# Patient Record
Sex: FEMALE | Race: WHITE | NOT HISPANIC OR LATINO | URBAN - METROPOLITAN AREA
[De-identification: names, ages, dates, MRNs, and addresses within clinical notes are randomized per-mention and may not be internally consistent; named-entity substitution may affect disease eponyms.]

---

## 2020-07-25 ENCOUNTER — TELEPHONE ENCOUNTER (OUTPATIENT)
Dept: URBAN - METROPOLITAN AREA CLINIC 13 | Facility: CLINIC | Age: 55
End: 2020-07-25

## 2020-07-25 RX ORDER — CETIRIZINE HYDROCHLORIDE 10 MG/1
TAKE 1 TABLET DAILY TABLET, FILM COATED ORAL
Refills: 0 | OUTPATIENT
End: 2013-02-28

## 2020-07-25 RX ORDER — FEXOFENADINE HYDROCHLORIDE 180 MG/1
TAKE 1 TABLET DAILY TABLET, FILM COATED ORAL
Refills: 0 | OUTPATIENT
End: 2012-06-22

## 2020-07-25 RX ORDER — DEXLANSOPRAZOLE 60 MG/1
TAKE ONE CAPSULE BY MOUTH EVERY MORNING BEFORE BREAKFAST CAPSULE, DELAYED RELEASE ORAL
Qty: 90 | Refills: 3 | OUTPATIENT
Start: 2012-04-24 | End: 2018-03-13

## 2020-07-25 RX ORDER — LINACLOTIDE 145 UG/1
TAKE 1 CAPSULE BY MOUTH EVERY MORNING ON AN EMPTY STOMACH CAPSULE, GELATIN COATED ORAL
Qty: 30 | Refills: 1 | OUTPATIENT
Start: 2013-07-22 | End: 2014-01-27

## 2020-07-25 RX ORDER — ESZOPICLONE 3 MG/1
TAKE 1 TABLET BEDTIME PRN TABLET, COATED ORAL
Qty: 30 | Refills: 5 | OUTPATIENT
End: 2017-04-04

## 2020-07-25 RX ORDER — SUCRALFATE 1 G/10ML
TAKE 1 TEASPOONFUL THREE TIMES A DAY SUSPENSION ORAL
Qty: 450 | Refills: 0 | OUTPATIENT
Start: 2012-04-25 | End: 2012-06-22

## 2020-07-25 RX ORDER — SUCRALFATE 1 G/1
DISSOLVE 1 TABLET IN 2-3 TABLESPOONS OF WATER AND DRINK BEFORE MEALS TABLET ORAL
Qty: 90 | Refills: 0 | OUTPATIENT
Start: 2014-02-10 | End: 2016-03-08

## 2020-07-25 RX ORDER — PANTOPRAZOLE SODIUM 40 MG/1
TAKE 1 TABLET DAILY TABLET, DELAYED RELEASE ORAL
Qty: 14 | Refills: 0 | OUTPATIENT
Start: 2014-05-02 | End: 2016-03-08

## 2020-07-25 RX ORDER — LANSOPRAZOLE 30 MG/1
TAKE 1 CAPSULE DAILY CAPSULE, DELAYED RELEASE ORAL
Qty: 30 | Refills: 0 | OUTPATIENT
Start: 2014-04-23 | End: 2016-03-08

## 2020-07-25 RX ORDER — LINACLOTIDE 290 UG/1
TAKE 1 CAPSULE DAILY 30 MINUTES BEFORE BREAKFAST CAPSULE, GELATIN COATED ORAL
Qty: 30 | Refills: 3 | OUTPATIENT
Start: 2013-11-15 | End: 2016-03-08

## 2020-07-25 RX ORDER — LINACLOTIDE 290 UG/1
TAKE 1 CAPSULE DAILY ON EMPTY STOMACH CAPSULE, GELATIN COATED ORAL
Qty: 30 | Refills: 1 | OUTPATIENT
Start: 2013-05-14 | End: 2013-07-22

## 2020-07-25 RX ORDER — MAGNESIUM HYDROXIDE 100 %
POWDER (GRAM) MISCELLANEOUS
Refills: 0 | OUTPATIENT
End: 2013-05-10

## 2020-07-26 ENCOUNTER — TELEPHONE ENCOUNTER (OUTPATIENT)
Dept: URBAN - METROPOLITAN AREA CLINIC 13 | Facility: CLINIC | Age: 55
End: 2020-07-26

## 2020-07-26 RX ORDER — SUCRALFATE 1 G/1
TABLET ORAL
Qty: 100 | Refills: 0 | Status: ACTIVE | COMMUNITY
Start: 2012-04-02

## 2020-07-26 RX ORDER — ALPRAZOLAM 0.5 MG/1
TABLET ORAL
Qty: 60 | Refills: 0 | Status: ACTIVE | COMMUNITY
Start: 2013-09-03

## 2020-07-26 RX ORDER — CIPROFLOXACIN HYDROCHLORIDE 500 MG/1
TK 1 T PO BID FOR 7 DAYS TABLET, FILM COATED ORAL
Qty: 14 | Refills: 0 | Status: ACTIVE | COMMUNITY
Start: 2019-10-28

## 2020-07-26 RX ORDER — METRONIDAZOLE 7.5 MG/G
GEL VAGINAL
Qty: 70 | Refills: 0 | Status: ACTIVE | COMMUNITY
Start: 2013-11-19

## 2020-07-26 RX ORDER — ESTRADIOL 0.1 MG/G
CREAM VAGINAL
Qty: 42 | Refills: 0 | Status: ACTIVE | COMMUNITY
Start: 2019-10-08

## 2020-07-26 RX ORDER — DESONIDE 0.5 MG/G
CREAM TOPICAL
Qty: 60 | Refills: 0 | Status: ACTIVE | COMMUNITY
Start: 2012-04-12

## 2020-07-26 RX ORDER — ESOMEPRAZOLE MAGNESIUM 40 MG
TK ONE C PO QD CAPSULE,DELAYED RELEASE (ENTERIC COATED) ORAL
Qty: 30 | Refills: 0 | Status: ACTIVE | COMMUNITY
Start: 2012-03-30

## 2020-07-26 RX ORDER — GABAPENTIN 100 MG/1
CAPSULE ORAL
Qty: 60 | Refills: 0 | Status: ACTIVE | COMMUNITY
Start: 2013-11-05

## 2020-07-26 RX ORDER — ALPRAZOLAM 0.5 MG/1
TK 1/2-1 T PO PRN FOR FLIGHT ANXIETY TABLET ORAL
Qty: 4 | Refills: 0 | Status: ACTIVE | COMMUNITY
Start: 2019-10-29

## 2020-07-26 RX ORDER — DEXLANSOPRAZOLE 60 MG/1
TAKE ONE CAPSULE BY MOUTH EVERY MORNING BEFORE BREAKFAST CAPSULE, DELAYED RELEASE ORAL
Qty: 90 | Refills: 3 | Status: ACTIVE | COMMUNITY
Start: 2019-11-07

## 2020-07-26 RX ORDER — PERMETHRIN CREAM 5% W/W 50 MG/G
APP QHS FROM NECK DOWN. LEAVE OVERNIGHT. WASH OFF NEXT AM. WASH BEDDIN CREAM TOPICAL
Qty: 60 | Refills: 0 | Status: ACTIVE | COMMUNITY
Start: 2012-04-12

## 2020-07-26 RX ORDER — OSELTAMIVIR PHOSPHATE 75 MG
CAPSULE ORAL
Qty: 10 | Refills: 0 | Status: ACTIVE | COMMUNITY
Start: 2012-11-14

## 2020-07-26 RX ORDER — PROCHLORPERAZINE MALEATE 10 MG/1
TABLET ORAL
Qty: 90 | Refills: 0 | Status: ACTIVE | COMMUNITY
Start: 2013-10-22

## 2020-07-26 RX ORDER — METHENAMINE, SODIUM PHOSPHATE, MONOBASIC, METHYLENE BLUE, AND HYOSCYAMINE SULFATE 81.6; 40.8; 10.8; .12 MG/1; MG/1; MG/1; MG/1
TABLET, COATED ORAL
Qty: 60 | Refills: 0 | Status: ACTIVE | COMMUNITY
Start: 2012-01-10

## 2020-07-26 RX ORDER — FLUCONAZOLE 150 MG/1
TABLET ORAL
Qty: 2 | Refills: 0 | Status: ACTIVE | COMMUNITY
Start: 2012-12-20

## 2020-07-26 RX ORDER — CICLESONIDE 50 UG/1
SPRAY NASAL
Qty: 12 | Refills: 0 | Status: ACTIVE | COMMUNITY
Start: 2011-08-03

## 2020-07-26 RX ORDER — CEFUROXIME AXETIL 250 MG/1
TABLET ORAL
Qty: 28 | Refills: 0 | Status: ACTIVE | COMMUNITY
Start: 2012-03-01

## 2020-07-26 RX ORDER — METHSCOPOLAMINE BROMIDE 2.5 MG/1
TABLET ORAL
Qty: 60 | Refills: 0 | Status: ACTIVE | COMMUNITY
Start: 2011-09-14

## 2020-07-26 RX ORDER — ACYCLOVIR 50 MG/G
OINTMENT TOPICAL
Qty: 15 | Refills: 0 | Status: ACTIVE | COMMUNITY
Start: 2013-12-13

## 2020-07-26 RX ORDER — FLUCONAZOLE 150 MG/1
TABLET ORAL
Qty: 1 | Refills: 0 | Status: ACTIVE | COMMUNITY
Start: 2012-11-24

## 2020-07-26 RX ORDER — FLUTICASONE PROPIONATE 50 UG/1
SPRAY, METERED NASAL
Qty: 16 | Refills: 0 | Status: ACTIVE | COMMUNITY
Start: 2012-03-01

## 2020-07-26 RX ORDER — LEVOCETIRIZINE DIHYDROCHLORIDE 5 MG/1
TABLET ORAL
Qty: 30 | Refills: 0 | Status: ACTIVE | COMMUNITY
Start: 2012-08-20

## 2020-07-26 RX ORDER — NITROFURANTOIN MONOHYDRATE/MACROCRYSTALLINE 25; 75 MG/1; MG/1
TK ONE C PO BID FOR 7 DAYS CAPSULE ORAL
Qty: 14 | Refills: 0 | Status: ACTIVE | COMMUNITY
Start: 2011-06-02

## 2020-07-26 RX ORDER — DEXAMETHASONE 4 MG/1
TABLET ORAL
Qty: 30 | Refills: 0 | Status: ACTIVE | COMMUNITY
Start: 2013-11-18

## 2020-07-26 RX ORDER — TAMOXIFEN CITRATE 10 MG/1
TAKE 1 TABLET EVERY 12 HOURS DAILY TABLET, FILM COATED ORAL
Refills: 0 | Status: ACTIVE | COMMUNITY
Start: 2014-04-04

## 2020-07-26 RX ORDER — CHLORHEXIDINE GLUCONATE 0.12% ORAL RINSE 1.2 MG/ML
SWISH 15ML PO FOR 30 SECONDS BID THEN EXPECTORATE SOLUTION ORAL
Qty: 255 | Refills: 0 | Status: ACTIVE | COMMUNITY
Start: 2011-03-25

## 2020-07-26 RX ORDER — FLUOCINOLONE ACETONIDE 0.11 MG/118.28ML
OIL TOPICAL
Qty: 118 | Refills: 0 | Status: ACTIVE | COMMUNITY
Start: 2013-08-30

## 2020-07-26 RX ORDER — METHENAMINE HIPPURATE 1 G/1
TABLET ORAL
Qty: 60 | Refills: 0 | Status: ACTIVE | COMMUNITY
Start: 2019-10-07

## 2020-07-26 RX ORDER — FLUOCINONIDE 0.5 MG/G
APP A THIN AMOUNT AA TID TO QID FOR 7 DAYS OR UNTIL HEALED GEL TOPICAL
Qty: 15 | Refills: 0 | Status: ACTIVE | COMMUNITY
Start: 2011-06-27

## 2020-07-26 RX ORDER — KETOCONAZOLE 20.5 MG/ML
U AS SHAMPOO AND LEAVE ON FOR 5-10 MINUTES BEFORE RINSING SHAMPOO, SUSPENSION TOPICAL
Qty: 120 | Refills: 0 | Status: ACTIVE | COMMUNITY
Start: 2011-03-08

## 2020-07-26 RX ORDER — NYSTATIN 100000 [USP'U]/ML
SUSPENSION ORAL
Qty: 255 | Refills: 0 | Status: ACTIVE | COMMUNITY
Start: 2014-01-07

## 2020-07-26 RX ORDER — VENLAFAXINE HYDROCHLORIDE 37.5 MG/1
CAPSULE, EXTENDED RELEASE ORAL
Qty: 30 | Refills: 0 | Status: ACTIVE | COMMUNITY
Start: 2014-01-03

## 2020-07-26 RX ORDER — KETOROLAC TROMETHAMINE 10 MG/1
TABLET, FILM COATED ORAL
Qty: 15 | Refills: 0 | Status: ACTIVE | COMMUNITY
Start: 2013-11-18

## 2020-07-26 RX ORDER — OXYCODONE AND ACETAMINOPHEN 5; 325 MG/1; MG/1
TK 1 T PO Q 6 H PRN P TABLET ORAL
Qty: 10 | Refills: 0 | Status: ACTIVE | COMMUNITY
Start: 2019-06-12

## 2020-07-26 RX ORDER — LORAZEPAM 1 MG/1
TABLET ORAL
Qty: 30 | Refills: 0 | Status: ACTIVE | COMMUNITY
Start: 2013-11-05

## 2020-07-26 RX ORDER — AMOXICILLIN 875 MG/1
TK 1 T PO  BID TABLET, FILM COATED ORAL
Qty: 14 | Refills: 0 | Status: ACTIVE | COMMUNITY
Start: 2012-05-25

## 2020-07-26 RX ORDER — AMOXICILLIN 875 MG/1
TABLET, FILM COATED ORAL
Qty: 14 | Refills: 0 | Status: ACTIVE | COMMUNITY
Start: 2012-08-15

## 2020-07-26 RX ORDER — CEPHALEXIN 500 MG/1
CAPSULE ORAL
Qty: 42 | Refills: 0 | Status: ACTIVE | COMMUNITY
Start: 2012-10-10

## 2020-07-26 RX ORDER — MUPIROCIN 20 MG/G
OINTMENT TOPICAL
Qty: 22 | Refills: 0 | Status: ACTIVE | COMMUNITY
Start: 2011-09-19

## 2020-07-26 RX ORDER — DEXLANSOPRAZOLE 30 MG/1
TAKE 1 CAPSULE BY MOUTH EVERY MORNING BEFORE BREAKFAST CAPSULE, DELAYED RELEASE ORAL
Qty: 90 | Refills: 3 | Status: ACTIVE | COMMUNITY
Start: 2018-03-13

## 2020-07-26 RX ORDER — HYDROCODONE BITARTRATE AND ACETAMINOPHEN 5; 325 MG/1; MG/1
TK 1 T PO Q 4-6 H  PRN P TABLET ORAL
Qty: 30 | Refills: 0 | Status: ACTIVE | COMMUNITY
Start: 2013-04-05

## 2020-07-26 RX ORDER — FLUOROURACIL 50 MG/G
APPLY TO THE FOREHEAD BID FOR 2 WEEKS CREAM TOPICAL
Qty: 40 | Refills: 0 | Status: ACTIVE | COMMUNITY
Start: 2019-02-04

## 2020-07-26 RX ORDER — LIDOCAINE AND PRILOCAINE 25; 25 MG/G; MG/G
CREAM TOPICAL
Qty: 30 | Refills: 0 | Status: ACTIVE | COMMUNITY
Start: 2011-10-10

## 2020-07-26 RX ORDER — NITROFURANTOIN MONOHYDRATE/MACROCRYSTALLINE 25; 75 MG/1; MG/1
CAPSULE ORAL
Qty: 10 | Refills: 0 | Status: ACTIVE | COMMUNITY
Start: 2012-01-08

## 2020-07-26 RX ORDER — TRIAMCINOLONE ACETONIDE 1 MG/G
APP AA BID.  DO NOT APPLY TO FACE CREAM TOPICAL
Qty: 255 | Refills: 0 | Status: ACTIVE | COMMUNITY
Start: 2012-04-12

## 2020-07-26 RX ORDER — AZITHROMYCIN DIHYDRATE 250 MG/1
USE UTD TABLET, FILM COATED ORAL
Qty: 6 | Refills: 0 | Status: ACTIVE | COMMUNITY
Start: 2013-04-05

## 2020-07-26 RX ORDER — OLOPATADINE HYDROCHLORIDE 600 UG/1
SPRAY, METERED NASAL
Qty: 30 | Refills: 0 | Status: ACTIVE | COMMUNITY
Start: 2011-08-03

## 2020-07-26 RX ORDER — PENTOSAN POLYSULFATE SODIUM 100 MG/1
CAPSULE, GELATIN COATED ORAL
Qty: 100 | Refills: 0 | Status: ACTIVE | COMMUNITY
Start: 2012-01-10

## 2020-07-26 RX ORDER — DIAZEPAM 10 MG/1
BRING TO PROCEDURE TABLET ORAL
Qty: 1 | Refills: 0 | Status: ACTIVE | COMMUNITY
Start: 2011-05-12

## 2020-07-26 RX ORDER — SULFAMETHOXAZOLE AND TRIMETHOPRIM 800; 160 MG/1; MG/1
TK 1 T PO BID FOR 7 DAYS TABLET ORAL
Qty: 14 | Refills: 0 | Status: ACTIVE | COMMUNITY
Start: 2019-10-20

## 2020-07-26 RX ORDER — FLUCONAZOLE 100 MG/1
TABLET ORAL
Qty: 2 | Refills: 0 | Status: ACTIVE | COMMUNITY
Start: 2011-09-12

## 2020-07-26 RX ORDER — TRETINOIN 0.5 MG/G
CREAM TOPICAL
Qty: 30 | Refills: 0 | Status: ACTIVE | COMMUNITY
Start: 2011-12-08

## 2020-07-26 RX ORDER — AMOXICILLIN AND CLAVULANATE POTASSIUM 875; 125 MG/1; MG/1
TABLET, FILM COATED ORAL
Qty: 14 | Refills: 0 | Status: ACTIVE | COMMUNITY
Start: 2012-01-21

## 2020-07-26 RX ORDER — PROMETHAZINE HYDROCHLORIDE 12.5 MG/1
SUPPOSITORY RECTAL
Qty: 20 | Refills: 0 | Status: ACTIVE | COMMUNITY
Start: 2013-12-11

## 2020-07-26 RX ORDER — BENZONATATE 100 MG/1
CAPSULE ORAL
Qty: 30 | Refills: 0 | Status: ACTIVE | COMMUNITY
Start: 2011-09-19

## 2020-07-26 RX ORDER — NYSTATIN AND TRIAMCINOLONE ACETONIDE 100000; 1 MG/G; MG/G
CREAM TOPICAL
Qty: 30 | Refills: 0 | Status: ACTIVE | COMMUNITY
Start: 2013-11-06

## 2020-07-26 RX ORDER — OXYCODONE AND ACETAMINOPHEN 5; 325 MG/1; MG/1
TABLET ORAL
Qty: 30 | Refills: 0 | Status: ACTIVE | COMMUNITY
Start: 2013-11-08

## 2020-07-26 RX ORDER — NYSTATIN AND TRIAMCINOLONE ACETONIDE 100000; 1 MG/G; MG/G
APPLY ON THE SKIN BID CREAM TOPICAL
Qty: 30 | Refills: 0 | Status: ACTIVE | COMMUNITY
Start: 2019-12-27

## 2020-09-17 ENCOUNTER — OFFICE VISIT (OUTPATIENT)
Dept: URBAN - METROPOLITAN AREA CLINIC 113 | Facility: CLINIC | Age: 55
End: 2020-09-17

## 2020-11-03 ENCOUNTER — OFFICE VISIT (OUTPATIENT)
Dept: URBAN - METROPOLITAN AREA CLINIC 113 | Facility: CLINIC | Age: 55
End: 2020-11-03

## 2020-11-03 NOTE — HPI-TODAY'S VISIT:
Ms. Traore is a 55-year-old woman with a history of GERD and irritable bowel syndrome, presenting for follow-up. She was last seen in August 2019 and GERD symptoms were controlled on Dexilant 30 mg daily.  Red quadrant ultrasound was ordered due to complaint of intermittent right upper quadrant pain.  Constipation predominant irritable bowel syndrome symptoms were controlled with dietary modifications and magnesium. Right upper quadrant ultrasound was normal on 9/9/2019.

## 2020-11-17 ENCOUNTER — OFFICE VISIT (OUTPATIENT)
Dept: URBAN - METROPOLITAN AREA CLINIC 113 | Facility: CLINIC | Age: 55
End: 2020-11-17

## 2020-12-01 ENCOUNTER — OFFICE VISIT (OUTPATIENT)
Dept: URBAN - METROPOLITAN AREA CLINIC 113 | Facility: CLINIC | Age: 55
End: 2020-12-01

## 2021-01-05 ENCOUNTER — OFFICE VISIT (OUTPATIENT)
Dept: URBAN - METROPOLITAN AREA CLINIC 113 | Facility: CLINIC | Age: 56
End: 2021-01-05
Payer: COMMERCIAL

## 2021-01-05 ENCOUNTER — WEB ENCOUNTER (OUTPATIENT)
Dept: URBAN - METROPOLITAN AREA CLINIC 113 | Facility: CLINIC | Age: 56
End: 2021-01-05

## 2021-01-05 VITALS
DIASTOLIC BLOOD PRESSURE: 81 MMHG | HEART RATE: 67 BPM | WEIGHT: 105 LBS | HEIGHT: 62 IN | TEMPERATURE: 98 F | BODY MASS INDEX: 19.32 KG/M2 | SYSTOLIC BLOOD PRESSURE: 123 MMHG

## 2021-01-05 DIAGNOSIS — K21.9 GERD: ICD-10-CM

## 2021-01-05 PROCEDURE — 99213 OFFICE O/P EST LOW 20 MIN: CPT | Performed by: PHYSICIAN ASSISTANT

## 2021-01-05 RX ORDER — DEXLANSOPRAZOLE 60 MG/1
TAKE ONE CAPSULE BY MOUTH EVERY MORNING BEFORE BREAKFAST CAPSULE, DELAYED RELEASE ORAL ONCE A DAY
Qty: 30 CAPSULES | Refills: 11
Start: 2019-11-07

## 2021-01-05 RX ORDER — DEXLANSOPRAZOLE 60 MG/1
TAKE ONE CAPSULE BY MOUTH EVERY MORNING BEFORE BREAKFAST CAPSULE, DELAYED RELEASE ORAL
Qty: 90 | Refills: 3 | Status: ACTIVE | COMMUNITY
Start: 2019-11-07

## 2021-01-05 RX ORDER — TAMOXIFEN CITRATE 10 MG/1
TAKE 1 TABLET EVERY 12 HOURS DAILY TABLET, FILM COATED ORAL
Refills: 0 | Status: ACTIVE | COMMUNITY
Start: 2014-04-04

## 2021-01-05 NOTE — HPI-TODAY'S VISIT:
Ms. Traore is a 55 year old woman with a history of GERD and irritable bowel syndrome, presenting for routine follow up. She was last seen in August, 2019 and GERD symptoms were controlled on Dexilant 30mg daily.  RUQ ultrasound was ordered to further evaluate intermittent RUQ pain. She began having breakthrough symptoms on Dexilant 30mg to resumed 60mg dosing with improvement of symptoms.  She is doing well on Dexilant 60mg daily.  She denies any breakthrough symptoms including dysphagia, heartburn or reflux.  She denies any other GI symptoms including nausea, vomiting, abdominal pain, diarrhea or constipation.  Her bowel habits are normal.

## 2021-01-05 NOTE — HPI-OTHER HISTORIES
RUQ ultrasound 9/9/19: normal. Labs 6/3/19: WBC 3.1, Hgb 11.7, MCV 82, Plt 195; TB 0.3, ALP 47, AST 18, ALT 10; BMP normal,  12.3.  EGD (10/25/13, dysphagia): esophagus was normal. She underwent empiric dilation with 15 mm Savary dilator.  Colonoscopy (5/10/13): normal.

## 2021-02-12 ENCOUNTER — TELEPHONE ENCOUNTER (OUTPATIENT)
Dept: URBAN - METROPOLITAN AREA CLINIC 113 | Facility: CLINIC | Age: 56
End: 2021-02-12

## 2021-02-12 RX ORDER — DEXLANSOPRAZOLE 60 MG/1
TAKE ONE CAPSULE BY MOUTH EVERY MORNING BEFORE BREAKFAST CAPSULE, DELAYED RELEASE ORAL ONCE A DAY
Qty: 90 | Refills: 1
Start: 2019-11-07

## 2021-07-01 ENCOUNTER — OFFICE VISIT (OUTPATIENT)
Dept: URBAN - METROPOLITAN AREA CLINIC 92 | Facility: CLINIC | Age: 56
End: 2021-07-01
Payer: COMMERCIAL

## 2021-07-01 VITALS
HEART RATE: 72 BPM | BODY MASS INDEX: 18.77 KG/M2 | HEIGHT: 62 IN | TEMPERATURE: 97 F | WEIGHT: 102 LBS | DIASTOLIC BLOOD PRESSURE: 71 MMHG | SYSTOLIC BLOOD PRESSURE: 145 MMHG

## 2021-07-01 DIAGNOSIS — R63.4 WEIGHT LOSS: ICD-10-CM

## 2021-07-01 DIAGNOSIS — K58.1 IRRITABLE BOWEL SYNDROME WITH CONSTIPATION: ICD-10-CM

## 2021-07-01 DIAGNOSIS — K21.9 GERD: ICD-10-CM

## 2021-07-01 DIAGNOSIS — R19.4 CHANGE IN BOWEL HABIT: ICD-10-CM

## 2021-07-01 DIAGNOSIS — K59.01 CONSTIPATION: ICD-10-CM

## 2021-07-01 DIAGNOSIS — C50.919 BREAST CANCER: ICD-10-CM

## 2021-07-01 DIAGNOSIS — K62.89 PROCTALGIA: ICD-10-CM

## 2021-07-01 DIAGNOSIS — K58.9 IBS (IRRITABLE BOWEL SYNDROME): ICD-10-CM

## 2021-07-01 PROCEDURE — 99244 OFF/OP CNSLTJ NEW/EST MOD 40: CPT | Performed by: INTERNAL MEDICINE

## 2021-07-01 RX ORDER — POLYETHYLENE GLYCOL 3350 17 G/17G
1 PACKET MIXED WITH 8 OUNCES OF FLUID POWDER, FOR SOLUTION ORAL ONCE A DAY
Qty: 30 | Refills: 11 | OUTPATIENT
Start: 2021-07-01 | End: 2022-06-26

## 2021-07-01 RX ORDER — HYDROCORTISONE ACETATE AND PRAMOXINE HYDROCHLORIDE 25; 10 MG/G; MG/G
1 APPLICATION CREAM TOPICAL THREE TIMES A DAY
Qty: 42 | Refills: 3 | OUTPATIENT
Start: 2021-07-01 | End: 2021-08-26

## 2021-07-01 RX ORDER — DEXLANSOPRAZOLE 60 MG/1
TAKE ONE CAPSULE BY MOUTH EVERY MORNING BEFORE BREAKFAST CAPSULE, DELAYED RELEASE ORAL ONCE A DAY
Qty: 90 | Refills: 1

## 2021-07-01 RX ORDER — TAMOXIFEN CITRATE 10 MG/1
TAKE 1 TABLET EVERY 12 HOURS DAILY TABLET, FILM COATED ORAL
Refills: 0 | Status: ACTIVE | COMMUNITY
Start: 2014-04-04

## 2021-07-01 RX ORDER — DEXLANSOPRAZOLE 60 MG/1
TAKE ONE CAPSULE BY MOUTH EVERY MORNING BEFORE BREAKFAST CAPSULE, DELAYED RELEASE ORAL ONCE A DAY
Qty: 90 | Refills: 1 | Status: ACTIVE | COMMUNITY
Start: 2019-11-07

## 2021-07-01 NOTE — HPI-TODAY'S VISIT:
wt decr 3# over 6mo (was 105)  Ms. Traore is a 55 year old woman with a history of GERD and irritable bowel syndrome, referred by Dr. Kimberly Bledsoe for a new onset of constipation; a copy of today's report will be sent to the referring MD  1 AMNA LEGER, small volume stool, assoc w proctalgia c/w prior fissure  no improvement w benefiber  She was last seen in August, 2019 and GERD symptoms were controlled on Dexilant 30mg daily.  RUQ ultrasound was ordered to further evaluate intermittent RUQ pain.  She began having breakthrough symptoms on Dexilant 30mg to resumed 60mg dosing with improvement of symptoms.  She is doing well on Dexilant 60mg daily.  She denies any breakthrough symptoms including dysphagia, heartburn or reflux.  She denies any other GI symptoms including nausea, vomiting, abdominal pain, diarrhea  RUQ ultrasound 9/9/19: normal. Labs 6/3/19: WBC 3.1, Hgb 11.7, MCV 82, Plt 195; TB 0.3, ALP 47, AST 18, ALT 10; BMP normal,  12.3.  EGD (10/25/13, dysphagia): esophagus was normal. She underwent empiric dilation with 15 mm Savary dilator.  Colonoscopy (5/10/13): normal.  [facility]

## 2021-07-15 ENCOUNTER — TELEPHONE ENCOUNTER (OUTPATIENT)
Dept: URBAN - METROPOLITAN AREA CLINIC 113 | Facility: CLINIC | Age: 56
End: 2021-07-15

## 2021-07-15 RX ORDER — DEXLANSOPRAZOLE 60 MG/1
TAKE ONE CAPSULE BY MOUTH EVERY MORNING BEFORE BREAKFAST CAPSULE, DELAYED RELEASE ORAL ONCE A DAY
Qty: 90 | Refills: 3

## 2021-08-02 ENCOUNTER — TELEPHONE ENCOUNTER (OUTPATIENT)
Dept: URBAN - METROPOLITAN AREA SURGERY CENTER 30 | Facility: SURGERY CENTER | Age: 56
End: 2021-08-02

## 2021-08-02 RX ORDER — DEXLANSOPRAZOLE 60 MG/1
TAKE ONE CAPSULE BY MOUTH EVERY MORNING BEFORE BREAKFAST CAPSULE, DELAYED RELEASE ORAL ONCE A DAY
Qty: 30 | Refills: 0

## 2021-09-01 PROBLEM — 10743008 IRRITABLE BOWEL SYNDROME: Status: ACTIVE | Noted: 2021-07-01

## 2021-09-01 PROBLEM — 254837009 BREAST CANCER: Status: ACTIVE | Noted: 2021-07-01

## 2021-09-13 ENCOUNTER — OFFICE VISIT (OUTPATIENT)
Dept: URBAN - METROPOLITAN AREA SURGERY CENTER 16 | Facility: SURGERY CENTER | Age: 56
End: 2021-09-13
Payer: COMMERCIAL

## 2021-09-13 DIAGNOSIS — R19.4 ALTERATION IN BOWEL ELIMINATION: ICD-10-CM

## 2021-09-13 PROCEDURE — 45378 DIAGNOSTIC COLONOSCOPY: CPT | Performed by: INTERNAL MEDICINE

## 2021-09-13 PROCEDURE — G8907 PT DOC NO EVENTS ON DISCHARG: HCPCS | Performed by: INTERNAL MEDICINE

## 2021-09-13 RX ORDER — POLYETHYLENE GLYCOL 3350 17 G/17G
1 PACKET MIXED WITH 8 OUNCES OF FLUID POWDER, FOR SOLUTION ORAL ONCE A DAY
Qty: 30 | Refills: 11 | Status: ACTIVE | COMMUNITY
Start: 2021-07-01 | End: 2022-06-26

## 2021-09-13 RX ORDER — TAMOXIFEN CITRATE 10 MG/1
TAKE 1 TABLET EVERY 12 HOURS DAILY TABLET, FILM COATED ORAL
Refills: 0 | Status: ACTIVE | COMMUNITY
Start: 2014-04-04

## 2021-09-13 RX ORDER — DEXLANSOPRAZOLE 60 MG/1
TAKE ONE CAPSULE BY MOUTH EVERY MORNING BEFORE BREAKFAST CAPSULE, DELAYED RELEASE ORAL ONCE A DAY
Qty: 30 | Refills: 0 | Status: ACTIVE | COMMUNITY

## 2021-09-24 ENCOUNTER — OFFICE VISIT (OUTPATIENT)
Dept: URBAN - METROPOLITAN AREA TELEHEALTH 2 | Facility: TELEHEALTH | Age: 56
End: 2021-09-24
Payer: COMMERCIAL

## 2021-09-24 DIAGNOSIS — K59.01 CONSTIPATION: ICD-10-CM

## 2021-09-24 DIAGNOSIS — K62.89 PROCTALGIA: ICD-10-CM

## 2021-09-24 DIAGNOSIS — K21.9 GERD: ICD-10-CM

## 2021-09-24 PROCEDURE — 99213 OFFICE O/P EST LOW 20 MIN: CPT | Performed by: INTERNAL MEDICINE

## 2021-09-24 RX ORDER — DEXLANSOPRAZOLE 60 MG/1
TAKE ONE CAPSULE BY MOUTH EVERY MORNING BEFORE BREAKFAST CAPSULE, DELAYED RELEASE ORAL ONCE A DAY
Qty: 30 | Refills: 0 | COMMUNITY

## 2021-09-24 RX ORDER — POLYETHYLENE GLYCOL 3350 17 G/17G
1 PACKET MIXED WITH 8 OUNCES OF FLUID POWDER, FOR SOLUTION ORAL ONCE A DAY
Qty: 30 | Refills: 11 | COMMUNITY
Start: 2021-07-01 | End: 2022-06-26

## 2021-09-24 RX ORDER — TAMOXIFEN CITRATE 10 MG/1
TAKE 1 TABLET EVERY 12 HOURS DAILY TABLET, FILM COATED ORAL
Refills: 0 | COMMUNITY
Start: 2014-04-04

## 2021-09-24 RX ORDER — DEXLANSOPRAZOLE 60 MG/1
TAKE ONE CAPSULE BY MOUTH EVERY MORNING BEFORE BREAKFAST CAPSULE, DELAYED RELEASE ORAL ONCE A DAY
Qty: 90 | Refills: 3

## 2021-09-24 NOTE — HPI-TODAY'S VISIT:
wt incr 4# over 3mo (was 102)  9/13/21 Colonoscopy WNL  1 BM QAM, small volume stool, improved on Miralax  no further proctalgia  requesting dexilant refills  RUQ ultrasound 9/9/19: normal. Labs 6/3/19: WBC 3.1, Hgb 11.7, MCV 82, Plt 195; TB 0.3, ALP 47, AST 18, ALT 10; BMP normal,  12.3.  EGD (10/25/13, dysphagia): esophagus was normal. She underwent empiric dilation with 15 mm Savary dilator.   3 children daughter Cassidy [facility] [place]

## 2021-10-13 ENCOUNTER — WEB ENCOUNTER (OUTPATIENT)
Dept: URBAN - METROPOLITAN AREA CLINIC 92 | Facility: CLINIC | Age: 56
End: 2021-10-13

## 2021-10-13 RX ORDER — DEXLANSOPRAZOLE 60 MG/1
TAKE ONE CAPSULE BY MOUTH EVERY MORNING BEFORE BREAKFAST CAPSULE, DELAYED RELEASE ORAL ONCE A DAY
Qty: 90 | Refills: 3 | Status: ACTIVE | COMMUNITY

## 2021-10-13 RX ORDER — POLYETHYLENE GLYCOL 3350 17 G/17G
1 PACKET MIXED WITH 8 OUNCES OF FLUID POWDER, FOR SOLUTION ORAL ONCE A DAY
Qty: 30 | Refills: 11 | COMMUNITY
Start: 2021-07-01 | End: 2022-06-26

## 2021-10-13 RX ORDER — TAMOXIFEN CITRATE 10 MG/1
TAKE 1 TABLET EVERY 12 HOURS DAILY TABLET, FILM COATED ORAL
Refills: 0 | COMMUNITY
Start: 2014-04-04

## 2021-10-14 ENCOUNTER — WEB ENCOUNTER (OUTPATIENT)
Dept: URBAN - METROPOLITAN AREA CLINIC 92 | Facility: CLINIC | Age: 56
End: 2021-10-14

## 2021-11-05 ENCOUNTER — WEB ENCOUNTER (OUTPATIENT)
Dept: URBAN - METROPOLITAN AREA CLINIC 92 | Facility: CLINIC | Age: 56
End: 2021-11-05

## 2021-11-29 ENCOUNTER — OFFICE VISIT (OUTPATIENT)
Dept: URBAN - METROPOLITAN AREA SURGERY CENTER 16 | Facility: SURGERY CENTER | Age: 56
End: 2021-11-29
Payer: COMMERCIAL

## 2021-11-29 DIAGNOSIS — K29.60 ADENOPAPILLOMATOSIS GASTRICA: ICD-10-CM

## 2021-11-29 DIAGNOSIS — K22.89 ESOPHAGEAL BLEEDING: ICD-10-CM

## 2021-11-29 PROCEDURE — G8907 PT DOC NO EVENTS ON DISCHARG: HCPCS | Performed by: INTERNAL MEDICINE

## 2021-11-29 PROCEDURE — 43239 EGD BIOPSY SINGLE/MULTIPLE: CPT | Performed by: INTERNAL MEDICINE

## 2021-11-29 RX ORDER — FAMOTIDINE 40 MG/1
1 TABLET AT BEDTIME TABLET, FILM COATED ORAL ONCE A DAY
Qty: 30 TABLET | Refills: 11 | OUTPATIENT
Start: 2021-11-29

## 2021-11-29 RX ORDER — TAMOXIFEN CITRATE 10 MG/1
TAKE 1 TABLET EVERY 12 HOURS DAILY TABLET, FILM COATED ORAL
Refills: 0 | COMMUNITY
Start: 2014-04-04

## 2021-11-29 RX ORDER — POLYETHYLENE GLYCOL 3350 17 G/17G
1 PACKET MIXED WITH 8 OUNCES OF FLUID POWDER, FOR SOLUTION ORAL ONCE A DAY
Qty: 30 | Refills: 11 | COMMUNITY
Start: 2021-07-01 | End: 2022-06-26

## 2021-11-29 RX ORDER — OMEPRAZOLE 40 MG/1
1 CAPSULE 30 MINUTES BEFORE MORNING MEAL CAPSULE, DELAYED RELEASE ORAL TWICE DAILY
Qty: 60 | Refills: 11 | OUTPATIENT
Start: 2021-11-29

## 2021-11-29 RX ORDER — DEXLANSOPRAZOLE 60 MG/1
TAKE ONE CAPSULE BY MOUTH EVERY MORNING BEFORE BREAKFAST CAPSULE, DELAYED RELEASE ORAL ONCE A DAY
Qty: 90 | Refills: 3 | Status: ACTIVE | COMMUNITY

## 2021-12-11 ENCOUNTER — OFFICE VISIT (OUTPATIENT)
Dept: URBAN - METROPOLITAN AREA CLINIC 92 | Facility: CLINIC | Age: 56
End: 2021-12-11

## 2021-12-17 ENCOUNTER — OFFICE VISIT (OUTPATIENT)
Dept: URBAN - METROPOLITAN AREA TELEHEALTH 2 | Facility: TELEHEALTH | Age: 56
End: 2021-12-17
Payer: COMMERCIAL

## 2021-12-17 DIAGNOSIS — K62.89 PROCTALGIA: ICD-10-CM

## 2021-12-17 DIAGNOSIS — K59.01 CONSTIPATION: ICD-10-CM

## 2021-12-17 DIAGNOSIS — K31.7 GASTRIC POLYP: ICD-10-CM

## 2021-12-17 DIAGNOSIS — K21.9 GERD: ICD-10-CM

## 2021-12-17 PROCEDURE — 99214 OFFICE O/P EST MOD 30 MIN: CPT | Performed by: INTERNAL MEDICINE

## 2021-12-17 RX ORDER — OMEPRAZOLE 40 MG/1
1 CAPSULE 30 MINUTES BEFORE MORNING MEAL CAPSULE, DELAYED RELEASE ORAL TWICE DAILY
Qty: 60 | Refills: 11 | COMMUNITY
Start: 2021-11-29

## 2021-12-17 RX ORDER — POLYETHYLENE GLYCOL 3350 17 G/17G
1 PACKET MIXED WITH 8 OUNCES OF FLUID POWDER, FOR SOLUTION ORAL ONCE A DAY
Qty: 30 | Refills: 11 | COMMUNITY
Start: 2021-07-01 | End: 2022-06-26

## 2021-12-17 RX ORDER — TAMOXIFEN CITRATE 10 MG/1
TAKE 1 TABLET EVERY 12 HOURS DAILY TABLET, FILM COATED ORAL
Refills: 0 | COMMUNITY
Start: 2014-04-04

## 2021-12-17 RX ORDER — OMEPRAZOLE 40 MG/1
1 CAPSULE 30 MINUTES BEFORE MORNING MEAL CAPSULE, DELAYED RELEASE ORAL TWICE DAILY
Refills: 0
Start: 2021-11-29

## 2021-12-17 RX ORDER — FAMOTIDINE 40 MG/1
1 TABLET AT BEDTIME TABLET, FILM COATED ORAL ONCE A DAY
Qty: 30 TABLET | Refills: 11 | COMMUNITY
Start: 2021-11-29

## 2021-12-17 RX ORDER — FAMOTIDINE 40 MG/1
1 TABLET AT BEDTIME TABLET, FILM COATED ORAL ONCE A DAY
Qty: 90 | Refills: 0
Start: 2021-11-29

## 2021-12-17 NOTE — PHYSICAL EXAM CONSTITUTIONAL:
in no acute distress, well developed, well nourished, ambulating without difficulty, normal communication ability
Statement Selected

## 2021-12-17 NOTE — HPI-TODAY'S VISIT:
wt decr 1# over 2.5mo (was 106)  11/29/21 EGD EGJ polyp, pathology benign Reflux esophagitis Erythematous gastropathy, bxs negative for hypylori Normal duodenum  9/13/21 Colonoscopy WNL  1 BM QAM, small volume stool, improved on Miralax  no further proctalgia  on PPI BID and H2B, much less dyspepsia - "feeling good"  RUQ ultrasound 9/9/19: normal.  3 children daughter ZAHRA, Cassidy [facility]

## 2021-12-21 ENCOUNTER — ERX REFILL RESPONSE (OUTPATIENT)
Dept: URBAN - METROPOLITAN AREA CLINIC 92 | Facility: CLINIC | Age: 56
End: 2021-12-21

## 2021-12-21 RX ORDER — OMEPRAZOLE 40 MG/1
1 CAPSULE 30 MINUTES BEFORE MORNING MEAL CAPSULE, DELAYED RELEASE ORAL TWICE DAILY
Qty: 60 | Refills: 11 | OUTPATIENT

## 2021-12-21 RX ORDER — FAMOTIDINE 40 MG/1
1 TABLET AT BEDTIME TABLET, FILM COATED ORAL ONCE A DAY
Qty: 30 TABLET | Refills: 11 | OUTPATIENT

## 2021-12-21 RX ORDER — OMEPRAZOLE 40 MG/1
1 CAPSULE 30 MINUTES BEFORE MORNING MEAL BY MOUTH TWICE DAILY CAPSULE, DELAYED RELEASE ORAL
Qty: 60 CAPSULE | Refills: 12 | OUTPATIENT

## 2021-12-21 RX ORDER — FAMOTIDINE 40 MG/1
TAKE 1 TABLET BY MOUTH EVERY DAY AT BEDTIME FOR 30 DAYS TABLET, FILM COATED ORAL
Qty: 30 TABLET | Refills: 12 | OUTPATIENT

## 2022-01-08 ENCOUNTER — WEB ENCOUNTER (OUTPATIENT)
Dept: URBAN - METROPOLITAN AREA CLINIC 92 | Facility: CLINIC | Age: 57
End: 2022-01-08

## 2022-01-08 RX ORDER — HYDROCORTISONE ACETATE AND PRAMOXINE HYDROCHLORIDE 25; 10 MG/G; MG/G
1 APPLICATION CREAM TOPICAL THREE TIMES A DAY
Qty: 42 | Refills: 3 | OUTPATIENT
Start: 2022-01-08 | End: 2022-03-05

## 2022-01-08 RX ORDER — TAMOXIFEN CITRATE 10 MG/1
TAKE 1 TABLET EVERY 12 HOURS DAILY TABLET, FILM COATED ORAL
Refills: 0 | COMMUNITY
Start: 2014-04-04

## 2022-01-08 RX ORDER — SACCHAROMYCES BOULARDII 50 MG
2 CAPSULE CAPSULE ORAL TWICE A DAY
Qty: 120 CAPSULE | Refills: 11 | OUTPATIENT
Start: 2022-01-08 | End: 2023-01-03

## 2022-01-08 RX ORDER — OMEPRAZOLE 40 MG/1
1 CAPSULE 30 MINUTES BEFORE MORNING MEAL BY MOUTH TWICE DAILY CAPSULE, DELAYED RELEASE ORAL
Qty: 60 CAPSULE | Refills: 12 | Status: ACTIVE | COMMUNITY

## 2022-01-08 RX ORDER — POLYETHYLENE GLYCOL 3350 17 G/17G
1 PACKET MIXED WITH 8 OUNCES OF FLUID POWDER, FOR SOLUTION ORAL ONCE A DAY
Qty: 30 | Refills: 11 | COMMUNITY
Start: 2021-07-01 | End: 2022-06-26

## 2022-01-08 RX ORDER — HYOSCYAMINE SULFATE 0.12 MG/1
1 TABLET UNDER THE TONGUE AND ALLOW TO DISSOLVE  AS NEEDED TABLET, ORALLY DISINTEGRATING ORAL THREE TIMES A DAY
Qty: 90 | Refills: 11 | OUTPATIENT
Start: 2022-01-08 | End: 2023-01-03

## 2022-01-08 RX ORDER — FAMOTIDINE 40 MG/1
TAKE 1 TABLET BY MOUTH EVERY DAY AT BEDTIME FOR 30 DAYS TABLET, FILM COATED ORAL
Qty: 30 TABLET | Refills: 12 | Status: ACTIVE | COMMUNITY

## 2022-01-21 ENCOUNTER — OFFICE VISIT (OUTPATIENT)
Dept: URBAN - METROPOLITAN AREA TELEHEALTH 2 | Facility: TELEHEALTH | Age: 57
End: 2022-01-21
Payer: COMMERCIAL

## 2022-01-21 DIAGNOSIS — K21.9 GERD: ICD-10-CM

## 2022-01-21 DIAGNOSIS — K31.7 GASTRIC POLYP: ICD-10-CM

## 2022-01-21 DIAGNOSIS — R19.7 DIARRHEA: ICD-10-CM

## 2022-01-21 DIAGNOSIS — C50.919 BREAST CA: ICD-10-CM

## 2022-01-21 DIAGNOSIS — K62.89 PROCTALGIA: ICD-10-CM

## 2022-01-21 DIAGNOSIS — K59.01 CONSTIPATION: ICD-10-CM

## 2022-01-21 DIAGNOSIS — K29.70 GASTRITIS: ICD-10-CM

## 2022-01-21 DIAGNOSIS — Z83.79 FAMILY HISTORY OF BARRETT'S ESOPHAGUS: ICD-10-CM

## 2022-01-21 PROCEDURE — 99213 OFFICE O/P EST LOW 20 MIN: CPT | Performed by: INTERNAL MEDICINE

## 2022-01-21 RX ORDER — POLYETHYLENE GLYCOL 3350 17 G/17G
1 PACKET MIXED WITH 8 OUNCES OF FLUID POWDER, FOR SOLUTION ORAL ONCE A DAY
Qty: 30 | Refills: 11 | COMMUNITY
Start: 2021-07-01 | End: 2022-06-26

## 2022-01-21 RX ORDER — FAMOTIDINE 40 MG/1
TAKE 1 TABLET BY MOUTH EVERY DAY AT BEDTIME FOR 30 DAYS TABLET, FILM COATED ORAL
Qty: 30 TABLET | Refills: 12 | COMMUNITY

## 2022-01-21 RX ORDER — HYDROCORTISONE ACETATE AND PRAMOXINE HYDROCHLORIDE 25; 10 MG/G; MG/G
1 APPLICATION CREAM TOPICAL THREE TIMES A DAY
Qty: 42 | Refills: 3 | COMMUNITY
Start: 2022-01-08 | End: 2022-03-05

## 2022-01-21 RX ORDER — FAMOTIDINE 40 MG/1
1 TABLET AT BEDTIME TABLET, FILM COATED ORAL ONCE A DAY
Qty: 90 | Refills: 0

## 2022-01-21 RX ORDER — OMEPRAZOLE 40 MG/1
1 CAPSULE 30 MINUTES BEFORE MORNING MEAL BY MOUTH TWICE DAILY CAPSULE, DELAYED RELEASE ORAL
Qty: 60 CAPSULE | Refills: 12 | COMMUNITY

## 2022-01-21 RX ORDER — SACCHAROMYCES BOULARDII 50 MG
2 CAPSULE CAPSULE ORAL TWICE A DAY
Qty: 120 CAPSULE | Refills: 11 | COMMUNITY
Start: 2022-01-08 | End: 2023-01-03

## 2022-01-21 RX ORDER — TAMOXIFEN CITRATE 10 MG/1
TAKE 1 TABLET EVERY 12 HOURS DAILY TABLET, FILM COATED ORAL
Refills: 0 | COMMUNITY
Start: 2014-04-04

## 2022-01-21 RX ORDER — OMEPRAZOLE 40 MG/1
1 CAPSULE 30 MINUTES BEFORE MORNING MEAL CAPSULE, DELAYED RELEASE ORAL TWICE DAILY
Refills: 0

## 2022-01-21 RX ORDER — HYOSCYAMINE SULFATE 0.12 MG/1
1 TABLET UNDER THE TONGUE AND ALLOW TO DISSOLVE  AS NEEDED TABLET, ORALLY DISINTEGRATING ORAL THREE TIMES A DAY
Qty: 90 | Refills: 11 | COMMUNITY
Start: 2022-01-08 | End: 2023-01-03

## 2022-01-21 NOTE — HPI-TODAY'S VISIT:
wt stable# over 1mo (was 105)  did have covid w assoc diarrhea 2wks ago, 6 watery bm / day now resolved had been rx'ed florastor levsin and analpram, now no longer needs  11/29/21 EGD EGJ polyp, pathology benign Reflux esophagitis Erythematous gastropathy, bxs negative for hypylori Normal duodenum  9/13/21 Colonoscopy WNL  constipation has resolved  no further proctalgia  on PPI BID and H2B, much less dyspepsia - "feeling good"  RUQ ultrasound 9/9/19: normal.  3 children daughter Cass[facility]

## 2022-02-02 ENCOUNTER — ERX REFILL RESPONSE (OUTPATIENT)
Dept: URBAN - METROPOLITAN AREA CLINIC 92 | Facility: CLINIC | Age: 57
End: 2022-02-02

## 2022-02-02 RX ORDER — HYOSCYAMINE SULFATE 0.12 MG/1
1 TABLET UNDER THE TONGUE AND ALLOW TO DISSOLVE AS NEEDED SUBLINGUAL THREE TIMES A DAY 30 DAYS TABLET ORAL; SUBLINGUAL
Qty: 90 TABLET | Refills: 12 | OUTPATIENT

## 2022-02-02 RX ORDER — HYOSCYAMINE SULFATE 0.12 MG/1
1 TABLET UNDER THE TONGUE AND ALLOW TO DISSOLVE  AS NEEDED TABLET, ORALLY DISINTEGRATING ORAL THREE TIMES A DAY
Qty: 90 | Refills: 11 | OUTPATIENT

## 2022-10-03 ENCOUNTER — WEB ENCOUNTER (OUTPATIENT)
Dept: URBAN - METROPOLITAN AREA CLINIC 92 | Facility: CLINIC | Age: 57
End: 2022-10-03

## 2022-10-24 ENCOUNTER — WEB ENCOUNTER (OUTPATIENT)
Dept: URBAN - METROPOLITAN AREA CLINIC 92 | Facility: CLINIC | Age: 57
End: 2022-10-24

## 2022-12-01 ENCOUNTER — TELEPHONE ENCOUNTER (OUTPATIENT)
Dept: URBAN - METROPOLITAN AREA CLINIC 92 | Facility: CLINIC | Age: 57
End: 2022-12-01

## 2022-12-01 ENCOUNTER — OFFICE VISIT (OUTPATIENT)
Dept: URBAN - METROPOLITAN AREA TELEHEALTH 2 | Facility: TELEHEALTH | Age: 57
End: 2022-12-01
Payer: COMMERCIAL

## 2022-12-01 VITALS — WEIGHT: 105 LBS | HEIGHT: 62 IN | BODY MASS INDEX: 19.32 KG/M2

## 2022-12-01 DIAGNOSIS — K59.01 CONSTIPATION: ICD-10-CM

## 2022-12-01 DIAGNOSIS — K62.89 PROCTALGIA: ICD-10-CM

## 2022-12-01 DIAGNOSIS — K21.9 GERD: ICD-10-CM

## 2022-12-01 DIAGNOSIS — R10.11 RUQ ABDOMINAL PAIN: ICD-10-CM

## 2022-12-01 PROBLEM — 4556007 GASTRITIS: Status: ACTIVE | Noted: 2021-12-13

## 2022-12-01 PROBLEM — 372064008 MALIGNANT NEOPLASM OF FEMALE BREAST: Status: ACTIVE | Noted: 2021-12-16

## 2022-12-01 PROBLEM — 235595009 GASTROESOPHAGEAL REFLUX DISEASE: Status: ACTIVE | Noted: 2021-01-05

## 2022-12-01 PROBLEM — 14760008 CONSTIPATION: Status: ACTIVE | Noted: 2021-07-01

## 2022-12-01 PROBLEM — 78809005 GASTRIC POLYP: Status: ACTIVE | Noted: 2021-12-13

## 2022-12-01 PROCEDURE — 99214 OFFICE O/P EST MOD 30 MIN: CPT | Performed by: INTERNAL MEDICINE

## 2022-12-01 RX ORDER — HYOSCYAMINE SULFATE 0.12 MG/1
1 TABLET UNDER THE TONGUE AND ALLOW TO DISSOLVE AS NEEDED SUBLINGUAL THREE TIMES A DAY 30 DAYS TABLET ORAL; SUBLINGUAL
Qty: 90 TABLET | Refills: 12 | Status: ON HOLD | COMMUNITY

## 2022-12-01 RX ORDER — TAMOXIFEN CITRATE 10 MG/1
TAKE 1 TABLET EVERY 12 HOURS DAILY TABLET, FILM COATED ORAL
Refills: 0 | Status: ACTIVE | COMMUNITY
Start: 2014-04-04

## 2022-12-01 RX ORDER — OMEPRAZOLE 40 MG/1
1 CAPSULE 30 MINUTES BEFORE MORNING MEAL CAPSULE, DELAYED RELEASE ORAL TWICE DAILY
Refills: 0 | Status: ACTIVE | COMMUNITY

## 2022-12-01 RX ORDER — SACCHAROMYCES BOULARDII 50 MG
2 CAPSULE CAPSULE ORAL TWICE A DAY
Qty: 120 CAPSULE | Refills: 11 | Status: ON HOLD | COMMUNITY
Start: 2022-01-08 | End: 2023-01-03

## 2022-12-01 RX ORDER — OMEPRAZOLE 40 MG/1
1 CAPSULE 30 MINUTES BEFORE MORNING MEAL CAPSULE, DELAYED RELEASE ORAL ONCE A DAY
Qty: 90 | Refills: 3

## 2022-12-01 RX ORDER — FAMOTIDINE 40 MG/1
1 TABLET AT BEDTIME TABLET, FILM COATED ORAL ONCE A DAY
Qty: 90 | Refills: 0 | Status: ON HOLD | COMMUNITY

## 2022-12-01 NOTE — HPI-TODAY'S VISIT:
wt stable# over 11mo (was 105)  new onset RUQ burning squeezing discomfort  11/29/21 EGD EGJ polyp, pathology benign Reflux esophagitis Erythematous gastropathy, bxs negative for hypylori Normal duodenum  9/13/21 Colonoscopy WNL  constipation improved on miralax  on PPI no further dyspepsia  RUQ ultrasound 9/9/19: normal.  3 children daughter ZAHRA, Cassidy [facility]

## 2022-12-05 ENCOUNTER — WEB ENCOUNTER (OUTPATIENT)
Dept: URBAN - METROPOLITAN AREA CLINIC 92 | Facility: CLINIC | Age: 57
End: 2022-12-05

## 2022-12-14 ENCOUNTER — WEB ENCOUNTER (OUTPATIENT)
Dept: URBAN - METROPOLITAN AREA CLINIC 92 | Facility: CLINIC | Age: 57
End: 2022-12-14

## 2023-03-04 ENCOUNTER — WEB ENCOUNTER (OUTPATIENT)
Dept: URBAN - METROPOLITAN AREA CLINIC 92 | Facility: CLINIC | Age: 58
End: 2023-03-04

## 2023-03-05 ENCOUNTER — WEB ENCOUNTER (OUTPATIENT)
Dept: URBAN - METROPOLITAN AREA CLINIC 92 | Facility: CLINIC | Age: 58
End: 2023-03-05

## 2023-03-07 ENCOUNTER — WEB ENCOUNTER (OUTPATIENT)
Dept: URBAN - METROPOLITAN AREA CLINIC 92 | Facility: CLINIC | Age: 58
End: 2023-03-07

## 2023-03-07 RX ORDER — OMEPRAZOLE 20 MG/1
1 CAPSULE 30 MINUTES BEFORE MORNING MEAL CAPSULE, DELAYED RELEASE ORAL ONCE A DAY
Qty: 90 | Refills: 3

## 2023-04-11 ENCOUNTER — WEB ENCOUNTER (OUTPATIENT)
Dept: URBAN - METROPOLITAN AREA CLINIC 92 | Facility: CLINIC | Age: 58
End: 2023-04-11

## 2023-05-10 ENCOUNTER — WEB ENCOUNTER (OUTPATIENT)
Dept: URBAN - METROPOLITAN AREA CLINIC 92 | Facility: CLINIC | Age: 58
End: 2023-05-10

## 2023-05-12 ENCOUNTER — WEB ENCOUNTER (OUTPATIENT)
Dept: URBAN - METROPOLITAN AREA CLINIC 92 | Facility: CLINIC | Age: 58
End: 2023-05-12

## 2023-07-05 PROBLEM — 87522002: Status: ACTIVE | Noted: 2023-07-05

## 2023-07-05 PROBLEM — 429087003: Status: ACTIVE | Noted: 2023-07-05

## 2023-07-06 ENCOUNTER — OFFICE VISIT (OUTPATIENT)
Dept: URBAN - METROPOLITAN AREA CLINIC 92 | Facility: CLINIC | Age: 58
End: 2023-07-06
Payer: COMMERCIAL

## 2023-07-06 VITALS
SYSTOLIC BLOOD PRESSURE: 141 MMHG | HEIGHT: 62 IN | BODY MASS INDEX: 19.69 KG/M2 | WEIGHT: 107 LBS | DIASTOLIC BLOOD PRESSURE: 84 MMHG | HEART RATE: 77 BPM | TEMPERATURE: 97 F

## 2023-07-06 DIAGNOSIS — R74.8 ELEVATED LIVER ENZYMES: ICD-10-CM

## 2023-07-06 DIAGNOSIS — Z85.3 PERSONAL HISTORY OF BREAST CANCER: ICD-10-CM

## 2023-07-06 DIAGNOSIS — D50.9 IRON DEFICIENCY ANEMIA, UNSPECIFIED IRON DEFICIENCY ANEMIA TYPE: ICD-10-CM

## 2023-07-06 PROCEDURE — 99204 OFFICE O/P NEW MOD 45 MIN: CPT | Performed by: INTERNAL MEDICINE

## 2023-07-06 RX ORDER — TAMOXIFEN CITRATE 10 MG/1
TAKE 1 TABLET EVERY 12 HOURS DAILY TABLET, FILM COATED ORAL
Refills: 0 | Status: ON HOLD | COMMUNITY
Start: 2014-04-04

## 2023-07-06 RX ORDER — OMEPRAZOLE 20 MG/1
1 CAPSULE 30 MINUTES BEFORE MORNING MEAL CAPSULE, DELAYED RELEASE ORAL ONCE A DAY
Qty: 90 | Refills: 3 | Status: ACTIVE | COMMUNITY

## 2023-07-06 RX ORDER — HYOSCYAMINE SULFATE 0.12 MG/1
1 TABLET UNDER THE TONGUE AND ALLOW TO DISSOLVE AS NEEDED SUBLINGUAL THREE TIMES A DAY 30 DAYS TABLET ORAL; SUBLINGUAL
Qty: 90 TABLET | Refills: 12 | Status: ON HOLD | COMMUNITY

## 2023-07-06 RX ORDER — FAMOTIDINE 40 MG/1
1 TABLET AT BEDTIME TABLET, FILM COATED ORAL ONCE A DAY
Qty: 90 | Refills: 0 | Status: ON HOLD | COMMUNITY

## 2023-07-06 NOTE — HPI-TODAY'S VISIT:
This is a 58-year-old female referred for GI evaluation and a copy will be sent to the primary care provider.  Patient has a history of leukopenia and was seeing hematology and also history of breast cancer for which she started chemotherapy in 2013.  She completed radiation therapy in 2014 and has been on tamoxifen.  She has a history of mild hypertension.  Previously patient was under the care of Dr. Buckner and I do see an upper endoscopy on November 29, 2021 done for dyspepsia and heartburn.  This showed a GE junction polyp that was 5 mm and biopsied also some erythema of the antrum.  Biopsies were sent.  Pathology showed slight chronic gastritis but no H. pylori and EG junction biopsy showed focal active inflammation and columnar mucosa with hyperplastic or reactive changes but no intestinal metaplasia.  Colonoscopy was done on September 13, 2021 for change in bowel habits, weight loss and constipation.  This exam revealed no gross abnormalities.  I do see labs from 2019 that shows a normal hemoglobin of 11.7, low white blood cell count of 3.1, normal transaminases.  Right upper quadrant ultrasound in 2019 was normal. Patient finished 5 iron infusion that started in April 2023. With routine lab work patien was informed her AST 67/ALT 50 are elevated. Rechecked liver enzymes on 6/19/22 and levels were still elevated.  Patient admits to an intermittent, stabbing RUQ. Patient has an incomplete BM so takes Miralax prn  Patient started ropinirole recently. Patient was on cipro last week for a UTI.  CT scan done on 12/15/23 revealed a kidney cyst otherwise unremarkable.

## 2023-07-10 LAB
ALBUMIN/GLOBULIN RATIO: 2.2
ALBUMIN: 4.6
ALKALINE PHOSPHATASE: 54
ALT (SGPT): 31
AST (SGOT): 20
BILIRUBIN, DIRECT: 0.1
BILIRUBIN, INDIRECT: 0.2
BILIRUBIN, TOTAL: 0.3
CERULOPLASMIN: 24
FERRITIN, SERUM: 51
GLOBULIN: 2.1
HEP A AB, IGM: (no result)
HEP B CORE AB, IGM: (no result)
HEPATITIS B SURFACE ANTIGEN: (no result)
HEPATITIS C ANTIBODY: (no result)
INR: 1
IRON BIND.CAP.(TIBC): 342
IRON SATURATION: 19
IRON: 65
MITOCHONDRIAL (M2) ANTIBODY: <=20
PROTEIN, TOTAL: 6.7
PT: 10.9

## 2023-07-12 ENCOUNTER — WEB ENCOUNTER (OUTPATIENT)
Dept: URBAN - METROPOLITAN AREA CLINIC 92 | Facility: CLINIC | Age: 58
End: 2023-07-12

## 2023-07-13 ENCOUNTER — WEB ENCOUNTER (OUTPATIENT)
Dept: URBAN - METROPOLITAN AREA CLINIC 92 | Facility: CLINIC | Age: 58
End: 2023-07-13

## 2023-07-13 ENCOUNTER — OFFICE VISIT (OUTPATIENT)
Dept: URBAN - METROPOLITAN AREA CLINIC 98 | Facility: CLINIC | Age: 58
End: 2023-07-13

## 2023-07-14 ENCOUNTER — WEB ENCOUNTER (OUTPATIENT)
Dept: URBAN - METROPOLITAN AREA CLINIC 92 | Facility: CLINIC | Age: 58
End: 2023-07-14

## 2023-07-19 ENCOUNTER — WEB ENCOUNTER (OUTPATIENT)
Dept: URBAN - METROPOLITAN AREA CLINIC 92 | Facility: CLINIC | Age: 58
End: 2023-07-19

## 2023-08-08 ENCOUNTER — TELEPHONE ENCOUNTER (OUTPATIENT)
Dept: URBAN - METROPOLITAN AREA CLINIC 86 | Facility: CLINIC | Age: 58
End: 2023-08-08

## 2023-08-08 NOTE — HPI-TODAY'S VISIT:
Patient is a 68-year-old female who is coming in to be seen for liver issues. A copy of the note will be sent to the referring providers. Patient has been seen last on July 6, 2023 by Dr. Angelina Sherman for iron deficiency. She has a history of leukopenia and was seen hematology and also has a history of breast cancer and she had been treated with chemotherapy back in 2013.  She completed radiation in 2014 and has been on tamoxifen. It is known that tamoxifen can add to fatty liver risk factors for patients and up to a third of patients on this. She also has a history of mild hypertension previously. She had been under the care of Dr. Buckner but he has since retired.  EGD done in November 29, 2021 for dyspepsia and heartburn.  She had a GE junction polyp that was 5 mm and was biopsied and sent erythema of the antrum.  Pathology showed slight chronic gastritis but no H. pylori, EG junction biopsy showed focal active inflammation and columnar mucosa with hyperplastic reactive changes but no intestinal metaplasia. Submitted 13 2021 colonoscopy showed no gross abnormalities. She recently in April 2023 finished IV iron infusion. AST was 67 and ALT 50 back in April. Labs were rechecked in June 19, 2023 and labs were also elevated. CT scan done in December 15 of last year revealed the kidney cysts was otherwise reported to be unremarkable. Medicine review shows medicines of concern including trazodone patient of note was underweight with a BMI of 19.57. Liver screens were sent off by her. I was able to review labs that she did on July 6 that showed an iron saturation that was 19%.  Ceruloplasmin 24 ferritin of 51 AMA negative at less than 20, hep A IgM negative, hep B core IgM negative, albumin was 4.6, bilirubin 0.3, direct bili 0.1 alk phos 54 with an AST of 20 and ALT of only 31. INR was 1.0.  Hep B surface antigen was negative.  Hep C antibody was negative. Please additional notes from [place] [facility] from June 6, 2023 where the patient was having some issues with recurrent UTIs and had not been using the estradiol cream they had given her some Cipro 500 mg twice daily for UTI and to use topical estradiol cream for her atrophic vaginitis. Epic records revealed from [place] January 2022 she had an AST that was 18 ALT of 13 alk phos 45 sodium 142 potassium 4.5 BUN of 14 creatinine 0.83 Dec 2022 ct abd: Narrative & Impression EXAM: CT ABDOMEN PELVIS W IV CONTRAST   CLINICAL INDICATION: RUQ pain.   TECHNIQUE: Following administration of non-ionic IV contrast, postcontrast images through the abdomen and pelvis were obtained. ESRC.[id number] If applicable, point-of-care testing?was approved following departmental protocol.   COMPARISON: None   FINDINGS:  Lower Thorax: Normal.   Liver: Too small to characterize left hepatic lobe hypodensity.   Gallbladder/Biliary Tree: Normal.   Spleen: Normal.   Pancreas: Normal.   Adrenal Glands: Normal.   Kidneys/Ureters: Left renal 4.6 cm simple cyst and too small to characterize hypodensity.   Gastrointestinal: Appendix is not seen, but no right lower quadrant inflammation or fluid.    Bladder: Decompressed.   Uterus/Adnexa: Normal.   Lymph Nodes: Normal.   Vessels: Normal.   Peritoneum/Retroperitoneum: Normal.   Bones/Soft Tissues: Normal.   IMPRESSION: No acute findings or etiology for pain.   May 2022 labs showed an AST of 19 ALT of 10 alk phos 44. January 28, 2022 labs showed alk phos 45 AST of 18 ALT of 13.  Plan 1.  _update imaging. 2.  Suspicious that patient could be having intermittent exposures to medications that could be raising this and will be important to keep a journal and track these issues. 3.  No clear signs of fatty liver related to tamoxifen even though that is a potential risk factor for her. 4.  Repeat labs to ensure that they are remaining stable and if so be on the look out for next triggers potentially.  Assessment 1.	Abnormal liver enzymes noted and appear to be intermittent and sometimes completely normal and ideal certainly speaking to me to some type of potential exposure to medication or issue rather than a chronic problem.  Screens done so far do not appear to show any acute issues and would hold on doing more screens unless other lab issues are noted.  Reported to have fatty liver but at the same time imaging that I could see did not clearly show that and sometimes ultrasounds overcall this from that.  Need to see what report actually suggested that she had a fatty liver and reassess from there possibly with more advanced imaging if needed.  Certainly had risk factor for this and that she had been on tamoxifen for years for the breast cancer and that can potentially do that and it is possible she has come off that and may have improved. 2.	Fatty liver listed but do not have documentation of this being actually present.  Certainly had risk factor for this with the previous tamoxifen usage. 3.	High risk medicine usage noted and being monitored. Keep a chart or notebook with medicines and antibiotics consistently as That could potentially affect the labs. 4.	Vaccine counseling should be checked for hep AMB immunity electively and if negative consider vaccine. 5.	Underweight status noted.  Certainly this could counterbalance the potential risk of the tamoxifen for the fatty liver. 6.	GERD history noted and sees her general GI. 7.	Irritable bowel history noted and sees her general GI. 8.	Gastritis history sees her general GI. 9.	Personal history of breast cancer in the remote past and was treated with tamoxifen for period time.  Typically stopped after about 10 years she may be coming up at nighttime. 10.	History of colonoscopy as per primary care team.  Duration of the visit was minutes with 10 minutes of chart prep and 20 minutes of face to face/TeleMed visit reviewing recent records, discussing their current status and the future plans for the patient.

## 2023-08-09 ENCOUNTER — OFFICE VISIT (OUTPATIENT)
Dept: URBAN - METROPOLITAN AREA CLINIC 86 | Facility: CLINIC | Age: 58
End: 2023-08-09
Payer: COMMERCIAL

## 2023-08-09 VITALS
HEIGHT: 62 IN | BODY MASS INDEX: 19.69 KG/M2 | WEIGHT: 107 LBS | HEART RATE: 82 BPM | SYSTOLIC BLOOD PRESSURE: 143 MMHG | TEMPERATURE: 97.1 F | DIASTOLIC BLOOD PRESSURE: 85 MMHG

## 2023-08-09 DIAGNOSIS — K58.9 IBS (IRRITABLE BOWEL SYNDROME): ICD-10-CM

## 2023-08-09 DIAGNOSIS — Z71.89 VACCINE COUNSELING: ICD-10-CM

## 2023-08-09 DIAGNOSIS — K21.9 GERD: ICD-10-CM

## 2023-08-09 DIAGNOSIS — R63.6 UNDERWEIGHT: ICD-10-CM

## 2023-08-09 DIAGNOSIS — R74.8 ABNORMAL LIVER ENZYMES: ICD-10-CM

## 2023-08-09 DIAGNOSIS — K29.70 GASTRITIS: ICD-10-CM

## 2023-08-09 DIAGNOSIS — Z85.3 PERSONAL HISTORY OF BREAST CANCER: ICD-10-CM

## 2023-08-09 DIAGNOSIS — K76.0 FATTY LIVER: ICD-10-CM

## 2023-08-09 DIAGNOSIS — Z79.899 HIGH RISK MEDICATION USE: ICD-10-CM

## 2023-08-09 PROCEDURE — 99215 OFFICE O/P EST HI 40 MIN: CPT

## 2023-08-09 PROCEDURE — 99245 OFF/OP CONSLTJ NEW/EST HI 55: CPT

## 2023-08-09 RX ORDER — FAMOTIDINE 40 MG/1
1 TABLET AT BEDTIME TABLET, FILM COATED ORAL ONCE A DAY
Qty: 90 | Refills: 0 | Status: DISCONTINUED | COMMUNITY

## 2023-08-09 RX ORDER — TAMOXIFEN CITRATE 10 MG/1
TAKE 1 TABLET EVERY 12 HOURS DAILY TABLET, FILM COATED ORAL
Refills: 0 | Status: DISCONTINUED | COMMUNITY
Start: 2014-04-04

## 2023-08-09 RX ORDER — OMEPRAZOLE 20 MG/1
1 CAPSULE 30 MINUTES BEFORE MORNING MEAL CAPSULE, DELAYED RELEASE ORAL ONCE A DAY
Qty: 90 | Refills: 3 | Status: ACTIVE | COMMUNITY

## 2023-08-09 RX ORDER — MULTIVIT-MIN/IRON/FOLIC ACID/K 18-600-40
1 CAPSULE CAPSULE ORAL ONCE A DAY
Status: ACTIVE | COMMUNITY

## 2023-08-09 RX ORDER — HYOSCYAMINE SULFATE 0.12 MG/1
1 TABLET UNDER THE TONGUE AND ALLOW TO DISSOLVE AS NEEDED SUBLINGUAL THREE TIMES A DAY 30 DAYS TABLET ORAL; SUBLINGUAL
Qty: 90 TABLET | Refills: 12 | Status: ON HOLD | COMMUNITY

## 2023-08-09 RX ORDER — ESTRADIOL 0.1 MG/G
AS DIRECTED CREAM VAGINAL
Status: ACTIVE | COMMUNITY

## 2023-08-09 NOTE — HPI-TODAY'S VISIT:
Patient is a 58-year-old female who is coming in to be seen for liver issues of abn labs and possible fatty liver.  A copy of the note will be sent to the referring providers.  Patient has been seen last on July 6, 2023 by Dr. Angelina Ricketts for iron deficiency.  She has a history of leukopenia and was seen hematology and also has a history of breast cancer and treated in [place] and now seeing Dr Hamilton in [place] and prior hormonal therapy back in 2013. She ended that in jan 2023 and stopped at 9 yrs and some bleeding and they stopped.  She completed radiation in 2014 and had been on tamoxifen for 9 yrs ending in Jan 2023.  Important that note she is underweight.  Again It is known that tamoxifen can add to fatty liver risk factors for patients and up to a third of patients on this and so that maybe her main rf for that and took for 9 yrs.  She also has a history of mild hypertension previously.  She had been under the care of Dr. Buckner but he has since retired and now sees Dr Ricketts.  EGD done in November 29, 2021 for dyspepsia and heartburn.  She had a GE junction polyp that was 5 mm and was biopsied and sent erythema of the antrum.  Pathology showed slight chronic gastritis but no H. pylori, EG junction biopsy showed focal active inflammation and columnar mucosa with hyperplastic reactive changes but no intestinal metaplasia.  Submitted 13 2021 colonoscopy showed no gross abnormalities.  She recently in April 2023 finished IV iron infusion x5.  AST was 67 and ALT 50 back in April.  Labs were rechecked in June 19, 2023 and labs were also elevated.  CT scan done in December 15 of  2022  revealed the kidney cysts was otherwise reported to be unremarkable.  Medicine review shows medicines of concern including trazodone patient of note was underweight with a BMI of 19.57.  She says she is off the trazodone is off since jan 2023.  She is now on omeprazole as medicine.  She ropinerole for restless legs.  No herbals.  Chamomile tea. No green tea and no turmeric.  No ashwagandha use. Occ does eat [place] food.  Daughter at [facility] and started eating buckwheat with buttermilk.  Liver screens were sent off by her.  I was able to review labs that she did on July 6 that showed an iron saturation that was 19%.  Ceruloplasmin 24 ferritin of 51 AMA negative at less than 20, hep A IgM negative, hep B core IgM negative, albumin was 4.6, bilirubin 0.3, direct bili 0.1 alk phos 54 with an AST of 20 and ALT of only 31. INR was 1.0.  Hep B surface antigen was negative.  Hep C antibody was negative.  She says primary in June 2023 had told were done and says oncologist at  had gone up and 3 weeks later then down.  Please additional notes from [place] [facility] from June 6, 2023 where the patient was having some issues with recurrent UTIs and had not been using the estradiol cream they had given her some Cipro 500 mg twice daily for UTI and to use topical estradiol cream for her atrophic vaginitis.  No macrodantin or sulfa for utis that she recalls.  Epic records revealed from [place] January 2022 she had an AST that was 18 ALT of 13 alk phos 45 sodium 142 potassium 4.5 BUN of 14 creatinine 0.83  Dec 2022 ct abd: Narrative & Impression EXAM: CT ABDOMEN PELVIS W IV CONTRAST   CLINICAL INDICATION: RUQ pain.   TECHNIQUE: Following administration of non-ionic IV contrast, postcontrast images through the abdomen and pelvis were obtained. ESRC.[id number] If applicable, point-of-care testing?was approved following departmental protocol.   COMPARISON: None   FINDINGS:  Lower Thorax: Normal.   Liver: Too small to characterize left hepatic lobe hypodensity.   Gallbladder/Biliary Tree: Normal.   Spleen: Normal.   Pancreas: Normal.   Adrenal Glands: Normal.   Kidneys/Ureters: Left renal 4.6 cm simple cyst and too small to characterize hypodensity.   Gastrointestinal: Appendix is not seen, but no right lower quadrant inflammation or fluid.    Bladder: Decompressed.   Uterus/Adnexa: Normal.   Lymph Nodes: Normal.   Vessels: Normal.   Peritoneum/Retroperitoneum: Normal.   Bones/Soft Tissues: Normal.   IMPRESSION: No acute findings or etiology for pain.  She says Dr Ricketts and was told mri. Imaging center that she did. AHI.  They told was fatty liver.   May 2022 labs showed an AST of 19 ALT of 10 alk phos 44.  January 28, 2022 labs showed alk phos 45 AST of 18 ALT of 13.  She says Dr Orr her urologist helped and vaginal dryness and doing better with estradiol that she is better.  Plan 1.   Check on the updated imaging that she did and curious from a nonfatty liver in dec 2022 and off tamoxifen to a fatty liver now. 2.  Suspicious that patient could be having intermittent exposures to medications that could be raising this and will be important to keep a journal and track these issues. 3.  No clear signs of fatty liver prior to this last scan that suggested this and she is off the tamoxifen even though that is a potential risk factor for her.  Addendum mri: 7-11-23: no significant steatosis and no suspicious mass and no gallstones and mild right sided hydronephrosis and fullness or proximal right ureter (she sees urologist) and consider a renal colic protocol.   This supports my theory of episodic bump of labs due to meds and the only medicine that tiejms to thsyt 5 iron tx and not since and the labs are dropping. Normal 3 x in 2022 that I could I see.  Duration of the visit was 80 minutes with 40 minutes of chart prep and 40 minutes of face to face visit reviewing recent records, discussing their current status and the future plans for the patient.

## 2023-08-09 NOTE — EXAM-PHYSICAL EXAM
Gen: awake and responsive. Eyes: anicteric, normal lids. Mouth: normal lips. Nose:  no drainage. Hearing: intact grossly. Neck: trachea midline and no jvd. CV: RRR no s3. Lungs: clear. No wheezes, Abd: Soft, nabs, nr, NT. No hsm. Ext: no sig edema, no sig palm erythema. Neuro: moves all 4 ext grossly. No asterixis. Skin: no pruritis and no sig palm erythema.

## 2023-08-10 ENCOUNTER — TELEPHONE ENCOUNTER (OUTPATIENT)
Dept: URBAN - METROPOLITAN AREA CLINIC 86 | Facility: CLINIC | Age: 58
End: 2023-08-10

## 2023-08-10 LAB
ALBUMIN/GLOBULIN RATIO: 2.2
ALBUMIN: 4.8
ALKALINE PHOSPHATASE: 53
ALT (SGPT): 34
AST (SGOT): 27
BILIRUBIN, DIRECT: 0.1
BILIRUBIN, INDIRECT: 0.4
BILIRUBIN, TOTAL: 0.5
GLOBULIN: 2.2
HEPATITIS A AB, TOTAL: (no result)
HEPATITIS B SURFACE AB IMMUNITY, QN: <5
PROTEIN, TOTAL: 7

## 2023-08-10 NOTE — HPI-TODAY'S VISIT:
Dear Carlota Traore, August 9 labs showed no immunity to hepatitis B and no immunity to hepatitis A.  You should consider getting the hepatitis a/B vaccine combined which is known as the Twinrix vaccine.  It is a series of 3 doses over 6 months and you can do this locally. Other tests that we did showed that your albumin was normal at 4.8, bilirubin was 0.5, direct bili was 0.1, alk phos was 53, AST was 27 and ALT was 34. For comparison on July 6 AST was 20 and your ALT was 31. These labs are only minimally higher now and need to be followed for any trends and correlate to any variables that you note as being a risk for them. This is why it is again so important to keep the journal that we talked about to look at as needed for these intermittent lab variations to see what recently may have been a potential trigger for you. I recall hearing from  you recently had been on some antibiotics in the form of Cipro twice a day but that was back in June. Where there any new meds since July but before the day we saw you? Please let us know. Please redo labs again luis miguel 3-4 weeks to look at trend. Dr Ibarra

## 2023-08-11 ENCOUNTER — WEB ENCOUNTER (OUTPATIENT)
Dept: URBAN - METROPOLITAN AREA CLINIC 86 | Facility: CLINIC | Age: 58
End: 2023-08-11

## 2023-08-25 ENCOUNTER — TELEPHONE ENCOUNTER (OUTPATIENT)
Dept: URBAN - METROPOLITAN AREA CLINIC 92 | Facility: CLINIC | Age: 58
End: 2023-08-25

## 2023-08-25 RX ORDER — OMEPRAZOLE 40 MG/1
1 CAPSULE 30 MINUTES BEFORE MORNING MEAL CAPSULE, DELAYED RELEASE ORAL ONCE A DAY
Qty: 90 | Refills: 3 | OUTPATIENT
Start: 2023-08-25

## 2023-08-28 ENCOUNTER — LAB OUTSIDE AN ENCOUNTER (OUTPATIENT)
Dept: URBAN - METROPOLITAN AREA CLINIC 86 | Facility: CLINIC | Age: 58
End: 2023-08-28

## 2023-09-09 ENCOUNTER — TELEPHONE ENCOUNTER (OUTPATIENT)
Dept: URBAN - METROPOLITAN AREA CLINIC 86 | Facility: CLINIC | Age: 58
End: 2023-09-09

## 2023-09-09 NOTE — HPI-TODAY'S VISIT:
Dear Carlota Traore August 28 [facility] labs were printed by [facility]. Total protein 6.9, albumin 4.7, bilirubin 0.5, direct bilirubin 0.1, alk phos 60, AST 23 and ALT 29 with ideal ALT less than 25. July 6 labs had shown an AST of 20 and ALT of 31 so we see that the ALT is slightly lower now. I saw that your appointment was now moved to November 8 and so we look forward to seeing you then. Dr. Ibarra

## 2023-09-25 ENCOUNTER — LAB OUTSIDE AN ENCOUNTER (OUTPATIENT)
Dept: URBAN - METROPOLITAN AREA CLINIC 86 | Facility: CLINIC | Age: 58
End: 2023-09-25

## 2023-10-23 ENCOUNTER — CLAIMS CREATED FROM THE CLAIM WINDOW (OUTPATIENT)
Dept: URBAN - METROPOLITAN AREA CLINIC 86 | Facility: CLINIC | Age: 58
End: 2023-10-23
Payer: COMMERCIAL

## 2023-10-23 VITALS
HEIGHT: 62 IN | BODY MASS INDEX: 19.51 KG/M2 | HEART RATE: 65 BPM | DIASTOLIC BLOOD PRESSURE: 75 MMHG | WEIGHT: 106 LBS | SYSTOLIC BLOOD PRESSURE: 149 MMHG | TEMPERATURE: 97.1 F

## 2023-10-23 DIAGNOSIS — R74.8 ABNORMAL LIVER ENZYMES: ICD-10-CM

## 2023-10-23 DIAGNOSIS — R63.6 UNDERWEIGHT: ICD-10-CM

## 2023-10-23 DIAGNOSIS — K76.0 FATTY LIVER: ICD-10-CM

## 2023-10-23 DIAGNOSIS — Z85.3 PERSONAL HISTORY OF BREAST CANCER: ICD-10-CM

## 2023-10-23 DIAGNOSIS — Z79.899 HIGH RISK MEDICATION USE: ICD-10-CM

## 2023-10-23 DIAGNOSIS — K29.70 GASTRITIS: ICD-10-CM

## 2023-10-23 DIAGNOSIS — K58.9 IBS (IRRITABLE BOWEL SYNDROME): ICD-10-CM

## 2023-10-23 DIAGNOSIS — Z71.89 VACCINE COUNSELING: ICD-10-CM

## 2023-10-23 DIAGNOSIS — K21.9 GERD: ICD-10-CM

## 2023-10-23 PROCEDURE — 99214 OFFICE O/P EST MOD 30 MIN: CPT | Performed by: PHYSICIAN ASSISTANT

## 2023-10-23 RX ORDER — MULTIVIT-MIN/IRON/FOLIC ACID/K 18-600-40
1 CAPSULE CAPSULE ORAL ONCE A DAY
Status: ACTIVE | COMMUNITY

## 2023-10-23 RX ORDER — HYOSCYAMINE SULFATE 0.12 MG/1
1 TABLET UNDER THE TONGUE AND ALLOW TO DISSOLVE AS NEEDED SUBLINGUAL THREE TIMES A DAY 30 DAYS TABLET ORAL; SUBLINGUAL
Qty: 90 TABLET | Refills: 12 | COMMUNITY

## 2023-10-23 RX ORDER — OMEPRAZOLE 20 MG/1
1 CAPSULE 30 MINUTES BEFORE MORNING MEAL CAPSULE, DELAYED RELEASE ORAL ONCE A DAY
Qty: 90 | Refills: 3 | Status: ACTIVE | COMMUNITY

## 2023-10-23 RX ORDER — OMEPRAZOLE 40 MG/1
1 CAPSULE 30 MINUTES BEFORE MORNING MEAL CAPSULE, DELAYED RELEASE ORAL ONCE A DAY
Qty: 90 | Refills: 3 | Status: ACTIVE | COMMUNITY
Start: 2023-08-25

## 2023-10-23 RX ORDER — ESTRADIOL 0.1 MG/G
AS DIRECTED CREAM VAGINAL
Status: ACTIVE | COMMUNITY

## 2023-10-23 NOTE — HPI-TODAY'S VISIT:
Patient is a 58-year-old female who is coming in to be seen for liver issues of abn labs and possible fatty liver.  A copy of the note will be sent to the referring providers.  10/23/23 ov Dear Carlota Traore August 28 [facility] labs were printed by [facility]. Total protein 6.9, albumin 4.7, bilirubin 0.5, direct bilirubin 0.1, alk phos 60, AST 23 and ALT 29 with ideal ALT less than 25.   August 9 labs showed no immunity to hepatitis B and no immunity to hepatitis A. You should consider getting the hepatitis a/B vaccine combined which is known as the Twinrix vaccine. It is a series of 3 doses over 6 months and you can do this locally. Other tests that we did showed that your albumin was normal at 4.8, bilirubin was 0.5, direct bili was 0.1, alk phos was 53, AST was 27 and ALT was 34. For comparison on July 6 AST was 20 and your ALT was 31. These labs are only minimally higher now and need to be followed for any trends and correlate to any variables that you note as being a risk for them. This is why it is again so important to keep the journal that we talked about to look at as needed for these intermittent lab variations to see what recently may have been a potential trigger for you. I recall hearing from you recently had been on some antibiotics in the form of Cipro twice a day but that was back in June. Where there any new meds since July but before the day we saw you? Please let us know. Please redo labs again luis miguel 3-4 weeks to look at trend.  She had tried to call and do the labs and no one got back to her and was tryinig to do  denies any recent illness denies recent abx  she noted her radiation site started to change like a chemo cream and she has not started efudex is the name of this   RECAP Addendum mri: 7-11-23: no significant steatosis and no suspicious mass and no gallstones and mild right sided hydronephrosis and fullness or proximal right ureter (she sees urologist) and consider a renal colic protocol.  This supports my theory of episodic bump of labs due to meds and the only medicine that tiejms to thsyt 5 iron tx and not since and the labs are dropping. Normal 3 x in 2022 that I could I see.  Patient has been seen last on July 6, 2023 by Dr. Angelina Ricketts for iron deficiency.  She has a history of leukopenia and was seen hematology and also has a history of breast cancer and treated in [place] and now seeing Dr Hamilton in [place] and prior hormonal therapy back in 2013. She ended that in jan 2023 and stopped at 9 yrs and some bleeding and they stopped.  She completed radiation in 2014 and had been on tamoxifen for 9 yrs ending in Jan 2023.  Important that note she is underweight.  Again It is known that tamoxifen can add to fatty liver risk factors for patients and up to a third of patients on this and so that maybe her main rf for that and took for 9 yrs.  She also has a history of mild hypertension previously.  She had been under the care of Dr. Buckner but he has since retired and now sees Dr Ricketts.  EGD done in November 29, 2021 for dyspepsia and heartburn.  She had a GE junction polyp that was 5 mm and was biopsied and sent erythema of the antrum.  Pathology showed slight chronic gastritis but no H. pylori, EG junction biopsy showed focal active inflammation and columnar mucosa with hyperplastic reactive changes but no intestinal metaplasia.  Submitted 13 2021 colonoscopy showed no gross abnormalities.  She recently in April 2023 finished IV iron infusion x5.  AST was 67 and ALT 50 back in April.  Labs were rechecked in June 19, 2023 and labs were also elevated.  CT scan done in December 15 of  2022  revealed the kidney cysts was otherwise reported to be unremarkable.  Medicine review shows medicines of concern including trazodone patient of note was underweight with a BMI of 19.57.  She says she is off the trazodone is off since jan 2023.  She is now on omeprazole as medicine.  She ropinerole for restless legs.  No herbals.  Chamomile tea. No green tea and no turmeric.  No ashwagandha use. Occ does eat Malagasy food.  Daughter at [facility] and started eating buckwheat with buttermilk.  Liver screens were sent off by her.  I was able to review labs that she did on July 6 that showed an iron saturation that was 19%.  Ceruloplasmin 24 ferritin of 51 AMA negative at less than 20, hep A IgM negative, hep B core IgM negative, albumin was 4.6, bilirubin 0.3, direct bili 0.1 alk phos 54 with an AST of 20 and ALT of only 31. INR was 1.0.  Hep B surface antigen was negative.  Hep C antibody was negative.  She says primary in June 2023 had told were done and says oncologist at  had gone up and 3 weeks later then down.  Please additional notes from [facility] from June 6, 2023 where the patient was having some issues with recurrent UTIs and had not been using the estradiol cream they had given her some Cipro 500 mg twice daily for UTI and to use topical estradiol cream for her atrophic vaginitis.  No macrodantin or sulfa for utis that she recalls.  Epic records revealed from [place] January 2022 she had an AST that was 18 ALT of 13 alk phos 45 sodium 142 potassium 4.5 BUN of 14 creatinine 0.83  Dec 2022 ct abd: Narrative & Impression EXAM: CT ABDOMEN PELVIS W IV CONTRAST   CLINICAL INDICATION: RUQ pain.   TECHNIQUE: Following administration of non-ionic IV contrast, postcontrast images through the abdomen and pelvis were obtained. [id number].1.7.1 If applicable, point-of-care testing?was approved following departmental protocol.   COMPARISON: None   FINDINGS:  Lower Thorax: Normal.   Liver: Too small to characterize left hepatic lobe hypodensity.   Gallbladder/Biliary Tree: Normal.   Spleen: Normal.   Pancreas: Normal.   Adrenal Glands: Normal.   Kidneys/Ureters: Left renal 4.6 cm simple cyst and too small to characterize hypodensity.   Gastrointestinal: Appendix is not seen, but no right lower quadrant inflammation or fluid.    Bladder: Decompressed.   Uterus/Adnexa: Normal.   Lymph Nodes: Normal.   Vessels: Normal.   Peritoneum/Retroperitoneum: Normal.   Bones/Soft Tissues: Normal.   IMPRESSION: No acute findings or etiology for pain.  She says Dr Ricketts and was told mri. Imaging center that she did. AHI.  They told was fatty liver.   May 2022 labs showed an AST of 19 ALT of 10 alk phos 44.  January 28, 2022 labs showed alk phos 45 AST of 18 ALT of 13.  She says Dr Orr her urologist helped and vaginal dryness and doing better with estradiol that she is better.

## 2023-10-24 LAB
ALBUMIN/GLOBULIN RATIO: 2.1
ALBUMIN: 4.7
ALKALINE PHOSPHATASE: 63
ALT (SGPT): 20
AST (SGOT): 19
BILIRUBIN, DIRECT: 0.1
BILIRUBIN, INDIRECT: 0.4
BILIRUBIN, TOTAL: 0.5
FERRITIN, SERUM: 34
GLOBULIN: 2.2
PROTEIN, TOTAL: 6.9

## 2023-10-25 ENCOUNTER — TELEPHONE ENCOUNTER (OUTPATIENT)
Dept: URBAN - METROPOLITAN AREA CLINIC 86 | Facility: CLINIC | Age: 58
End: 2023-10-25

## 2023-10-25 NOTE — HPI-TODAY'S VISIT:
Dear Carlota Sosa,   The 10/23/23 labs sent to us. The ferritin 34, normal. The bilirubin 0.5 and this is normal, the alkaline phosphatase 63, ast 19, alt 20. These are normal. Goal for the ast/alt is less than 25. Happy to see this.   Shahla John PA-C

## 2023-11-03 ENCOUNTER — OFFICE VISIT (OUTPATIENT)
Dept: URBAN - METROPOLITAN AREA CLINIC 92 | Facility: CLINIC | Age: 58
End: 2023-11-03
Payer: COMMERCIAL

## 2023-11-03 VITALS
SYSTOLIC BLOOD PRESSURE: 128 MMHG | WEIGHT: 106 LBS | HEART RATE: 69 BPM | BODY MASS INDEX: 19.51 KG/M2 | TEMPERATURE: 96.5 F | DIASTOLIC BLOOD PRESSURE: 78 MMHG | HEIGHT: 62 IN

## 2023-11-03 DIAGNOSIS — K21.9 GASTROESOPHAGEAL REFLUX DISEASE, UNSPECIFIED WHETHER ESOPHAGITIS PRESENT: ICD-10-CM

## 2023-11-03 DIAGNOSIS — R74.8 ELEVATED LIVER ENZYMES: ICD-10-CM

## 2023-11-03 DIAGNOSIS — D50.9 IRON DEFICIENCY ANEMIA, UNSPECIFIED IRON DEFICIENCY ANEMIA TYPE: ICD-10-CM

## 2023-11-03 DIAGNOSIS — Z85.3 PERSONAL HISTORY OF BREAST CANCER: ICD-10-CM

## 2023-11-03 PROBLEM — 235595009: Status: ACTIVE | Noted: 2023-11-03

## 2023-11-03 PROCEDURE — 99214 OFFICE O/P EST MOD 30 MIN: CPT

## 2023-11-03 RX ORDER — FAMOTIDINE 40 MG/1
1 TABLET AT BEDTIME TABLET, FILM COATED ORAL ONCE A DAY
Qty: 90 TABLET | Refills: 3 | OUTPATIENT
Start: 2023-11-03

## 2023-11-03 RX ORDER — SODIUM, POTASSIUM,MAG SULFATES 17.5-3.13G
177ML SOLUTION, RECONSTITUTED, ORAL ORAL
Qty: 1 KIT | Refills: 0 | OUTPATIENT
Start: 2023-11-03 | End: 2023-11-04

## 2023-11-03 RX ORDER — OMEPRAZOLE 20 MG/1
1 CAPSULE 30 MINUTES BEFORE MORNING MEAL CAPSULE, DELAYED RELEASE ORAL ONCE A DAY
Qty: 90 | Refills: 3 | Status: DISCONTINUED | COMMUNITY

## 2023-11-03 RX ORDER — MULTIVIT-MIN/IRON/FOLIC ACID/K 18-600-40
1 CAPSULE CAPSULE ORAL ONCE A DAY
Status: DISCONTINUED | COMMUNITY

## 2023-11-03 RX ORDER — ONDANSETRON HYDROCHLORIDE 4 MG/1
2 TABLET TABLET, FILM COATED ORAL
Qty: 2 | Refills: 0 | OUTPATIENT
Start: 2023-11-03

## 2023-11-03 RX ORDER — OMEPRAZOLE 40 MG/1
1 CAPSULE 30 MINUTES BEFORE MORNING MEAL CAPSULE, DELAYED RELEASE ORAL ONCE A DAY
Qty: 90 | Refills: 3 | Status: ACTIVE | COMMUNITY
Start: 2023-08-25

## 2023-11-03 RX ORDER — ESTRADIOL 0.1 MG/G
AS DIRECTED CREAM VAGINAL
Status: DISCONTINUED | COMMUNITY

## 2023-11-03 RX ORDER — HYOSCYAMINE SULFATE 0.12 MG/1
1 TABLET UNDER THE TONGUE AND ALLOW TO DISSOLVE AS NEEDED SUBLINGUAL THREE TIMES A DAY 30 DAYS TABLET ORAL; SUBLINGUAL
Qty: 90 TABLET | Refills: 12 | Status: DISCONTINUED | COMMUNITY

## 2023-11-03 NOTE — HPI-TODAY'S VISIT:
This is a 58-year-old female referred for GI evaluation and a copy will be sent to the primary care provider. Patient was previously seen by Dr. Ricketts. Patient has a history of leukopenia and was seeing hematology and also history of breast cancer for which she started chemotherapy in 2013.  She completed radiation therapy in 2014 and has been on tamoxifen.  She has a history of mild hypertension.  Previously patient was under the care of Dr. Buckner and I do see an upper endoscopy on November 29, 2021 done for dyspepsia and heartburn.  This showed a GE junction polyp that was 5 mm and biopsied also some erythema of the antrum.  Biopsies were sent.  Pathology showed slight chronic gastritis but no H. pylori and EGD junction biopsy showed focal active inflammation and columnar mucosa with hyperplastic or reactive changes but no intestinal metaplasia.  Colonoscopy was done on September 13, 2021 for change in bowel habits, weight loss and constipation.  This exam revealed no gross abnormalities.  I do see labs from 2019 that shows a normal hemoglobin of 11.7, low white blood cell count of 3.1, normal transaminases.  Right upper quadrant ultrasound in 2019 was normal. Patient finished 5 iron infusion that started in April 2023. With routine lab work patient was informed her AST 67/ALT 50 are elevated. Rechecked liver enzymes on 6/19/22 and levels were still elevated. Most recent labs showed liver enzymes have normalized, the elevation was likely caused by the iron infusion. Current ferritin 32 per patient. She is not on iron supplements.   CT scan done on 12/15/23 revealed a kidney cyst otherwise unremarkable.  Today, patient notes she continues to experience reflux but is avoiding triggering foods. She recently began taking omeprazole before coffee in the mornings which has made a difference. Patient notes she does wake up with reflux and hoarseness. Denies dysphagia, odynophagia, nausea, or vomiting. Denies abdominal pain, diarrhea, constipation, hematochezia, or melena. Patient notes she does not take any other medication besides omeprazole. Denies cardiac, lung, or kidney issues. No pacemaker, No blood thinner, no home O2.

## 2023-11-09 ENCOUNTER — LAB OUTSIDE AN ENCOUNTER (OUTPATIENT)
Dept: URBAN - METROPOLITAN AREA CLINIC 86 | Facility: CLINIC | Age: 58
End: 2023-11-09

## 2023-11-21 ENCOUNTER — OFFICE VISIT (OUTPATIENT)
Dept: URBAN - METROPOLITAN AREA SURGERY CENTER 16 | Facility: SURGERY CENTER | Age: 58
End: 2023-11-21

## 2023-11-21 ENCOUNTER — OUT OF OFFICE VISIT (OUTPATIENT)
Dept: URBAN - METROPOLITAN AREA SURGERY CENTER 16 | Facility: SURGERY CENTER | Age: 58
End: 2023-11-21
Payer: COMMERCIAL

## 2023-11-21 DIAGNOSIS — K31.7 BENIGN GASTRIC POLYP: ICD-10-CM

## 2023-11-21 DIAGNOSIS — D50.8 ACHLORHYDRIC ANEMIA: ICD-10-CM

## 2023-11-21 DIAGNOSIS — K64.8 EXTERNAL HEMORRHOIDS: ICD-10-CM

## 2023-11-21 DIAGNOSIS — K29.60 ADENOPAPILLOMATOSIS GASTRICA: ICD-10-CM

## 2023-11-21 DIAGNOSIS — K44.9 DIAPHRAGMATIC HERNIA: ICD-10-CM

## 2023-11-21 DIAGNOSIS — K31.89 ACHYLIA: ICD-10-CM

## 2023-11-21 DIAGNOSIS — D50.9 ANEMIA: ICD-10-CM

## 2023-11-21 PROCEDURE — 45378 DIAGNOSTIC COLONOSCOPY: CPT | Performed by: INTERNAL MEDICINE

## 2023-11-21 PROCEDURE — 43239 EGD BIOPSY SINGLE/MULTIPLE: CPT | Performed by: INTERNAL MEDICINE

## 2023-11-21 PROCEDURE — 00813 ANES UPR LWR GI NDSC PX: CPT | Performed by: ANESTHESIOLOGIST ASSISTANT

## 2023-11-21 PROCEDURE — G8907 PT DOC NO EVENTS ON DISCHARG: HCPCS | Performed by: INTERNAL MEDICINE

## 2023-11-21 PROCEDURE — 00813 ANES UPR LWR GI NDSC PX: CPT | Performed by: ANESTHESIOLOGY

## 2023-11-21 PROCEDURE — 43251 EGD REMOVE LESION SNARE: CPT | Performed by: INTERNAL MEDICINE

## 2023-11-21 RX ORDER — FAMOTIDINE 40 MG/1
1 TABLET AT BEDTIME TABLET, FILM COATED ORAL ONCE A DAY
Qty: 90 TABLET | Refills: 3 | Status: ACTIVE | COMMUNITY
Start: 2023-11-03

## 2023-11-21 RX ORDER — ONDANSETRON HYDROCHLORIDE 4 MG/1
2 TABLET TABLET, FILM COATED ORAL
Qty: 2 | Refills: 0 | Status: ACTIVE | COMMUNITY
Start: 2023-11-03

## 2023-11-21 RX ORDER — OMEPRAZOLE 40 MG/1
1 CAPSULE 30 MINUTES BEFORE MORNING MEAL CAPSULE, DELAYED RELEASE ORAL ONCE A DAY
Qty: 90 | Refills: 3 | Status: ACTIVE | COMMUNITY
Start: 2023-08-25

## 2023-11-22 ENCOUNTER — TELEPHONE ENCOUNTER (OUTPATIENT)
Dept: URBAN - METROPOLITAN AREA CLINIC 92 | Facility: CLINIC | Age: 58
End: 2023-11-22

## 2023-12-04 ENCOUNTER — LAB OUTSIDE AN ENCOUNTER (OUTPATIENT)
Dept: URBAN - METROPOLITAN AREA CLINIC 86 | Facility: CLINIC | Age: 58
End: 2023-12-04

## 2023-12-08 ENCOUNTER — WEB ENCOUNTER (OUTPATIENT)
Dept: URBAN - METROPOLITAN AREA CLINIC 92 | Facility: CLINIC | Age: 58
End: 2023-12-08

## 2023-12-11 ENCOUNTER — TELEPHONE ENCOUNTER (OUTPATIENT)
Dept: URBAN - METROPOLITAN AREA CLINIC 92 | Facility: CLINIC | Age: 58
End: 2023-12-11

## 2023-12-15 ENCOUNTER — WEB ENCOUNTER (OUTPATIENT)
Dept: URBAN - METROPOLITAN AREA CLINIC 92 | Facility: CLINIC | Age: 58
End: 2023-12-15

## 2023-12-18 ENCOUNTER — OFFICE VISIT (OUTPATIENT)
Dept: URBAN - METROPOLITAN AREA CLINIC 91 | Facility: CLINIC | Age: 58
End: 2023-12-18

## 2024-01-03 ENCOUNTER — OFFICE VISIT (OUTPATIENT)
Dept: URBAN - METROPOLITAN AREA CLINIC 91 | Facility: CLINIC | Age: 59
End: 2024-01-03

## 2024-01-05 ENCOUNTER — TELEPHONE ENCOUNTER (OUTPATIENT)
Dept: URBAN - METROPOLITAN AREA CLINIC 92 | Facility: CLINIC | Age: 59
End: 2024-01-05

## 2024-01-18 ENCOUNTER — WEB ENCOUNTER (OUTPATIENT)
Dept: URBAN - METROPOLITAN AREA CLINIC 86 | Facility: CLINIC | Age: 59
End: 2024-01-18

## 2024-01-23 ENCOUNTER — LAB OUTSIDE AN ENCOUNTER (OUTPATIENT)
Dept: URBAN - METROPOLITAN AREA CLINIC 86 | Facility: CLINIC | Age: 59
End: 2024-01-23

## 2024-01-24 ENCOUNTER — OFFICE VISIT (OUTPATIENT)
Dept: URBAN - METROPOLITAN AREA CLINIC 86 | Facility: CLINIC | Age: 59
End: 2024-01-24

## 2024-01-29 ENCOUNTER — OFFICE VISIT (OUTPATIENT)
Dept: URBAN - METROPOLITAN AREA CLINIC 91 | Facility: CLINIC | Age: 59
End: 2024-01-29
Payer: COMMERCIAL

## 2024-01-29 DIAGNOSIS — D50.9 ANEMIA: ICD-10-CM

## 2024-01-29 PROCEDURE — 91110 GI TRC IMG INTRAL ESOPH-ILE: CPT | Performed by: INTERNAL MEDICINE

## 2024-01-30 ENCOUNTER — TELEPHONE ENCOUNTER (OUTPATIENT)
Dept: URBAN - METROPOLITAN AREA CLINIC 98 | Facility: CLINIC | Age: 59
End: 2024-01-30

## 2024-01-31 ENCOUNTER — LAB OUTSIDE AN ENCOUNTER (OUTPATIENT)
Dept: URBAN - METROPOLITAN AREA CLINIC 98 | Facility: CLINIC | Age: 59
End: 2024-01-31

## 2024-03-19 ENCOUNTER — OV EP (OUTPATIENT)
Dept: URBAN - METROPOLITAN AREA CLINIC 92 | Facility: CLINIC | Age: 59
End: 2024-03-19
Payer: COMMERCIAL

## 2024-03-19 VITALS
SYSTOLIC BLOOD PRESSURE: 136 MMHG | WEIGHT: 105.8 LBS | BODY MASS INDEX: 19.47 KG/M2 | HEIGHT: 62 IN | TEMPERATURE: 96.4 F | DIASTOLIC BLOOD PRESSURE: 80 MMHG | HEART RATE: 57 BPM

## 2024-03-19 DIAGNOSIS — K64.8 INTERNAL HEMORRHOID: ICD-10-CM

## 2024-03-19 DIAGNOSIS — K59.09 CHRONIC CONSTIPATION: ICD-10-CM

## 2024-03-19 DIAGNOSIS — D50.8 ACHLORHYDRIC ANEMIA: ICD-10-CM

## 2024-03-19 PROCEDURE — 99215 OFFICE O/P EST HI 40 MIN: CPT | Performed by: INTERNAL MEDICINE

## 2024-03-19 RX ORDER — OMEPRAZOLE 40 MG/1
1 CAPSULE 30 MINUTES BEFORE MORNING MEAL CAPSULE, DELAYED RELEASE ORAL ONCE A DAY
Qty: 90 | Refills: 3 | Status: ACTIVE | COMMUNITY
Start: 2023-08-25

## 2024-03-19 RX ORDER — ONDANSETRON HYDROCHLORIDE 4 MG/1
2 TABLET TABLET, FILM COATED ORAL
Qty: 2 | Refills: 0 | Status: ACTIVE | COMMUNITY
Start: 2023-11-03

## 2024-03-19 RX ORDER — FAMOTIDINE 40 MG/1
1 TABLET AT BEDTIME TABLET, FILM COATED ORAL ONCE A DAY
Qty: 90 TABLET | Refills: 3 | Status: ACTIVE | COMMUNITY
Start: 2023-11-03

## 2024-03-19 RX ORDER — HYDROCORTISONE ACETATE 25 MG/1
1 SUPPOSITORY SUPPOSITORY RECTAL TWICE A DAY
Qty: 28 SUPPOSITORIES | Refills: 1 | OUTPATIENT
Start: 2024-03-19 | End: 2024-04-16

## 2024-03-19 NOTE — EXAM-FUNCTIONAL ASSESSMENT
CONSTITUTIONAL - well-developed, well-nourished, NAD HEENT - sclerae and conjuntivae appear normal, external nose normal appearance, no nasal discharge NECK - normal appearance, no deformities CHEST - no increased work of breathing, no accessory muscle use CV - no edema, regular rate GI - soft, NTND, no guarding or rigidity, no palpable masses or HSM MSK - no deformities, normal gait SKIN - good turgor, no obvious rashes NEURO - AAOx3 PSYCH - normal mood and appropriate affect  RECTAL - IH, no EH or fissures

## 2024-03-19 NOTE — HPI-TODAY'S VISIT:
This is a 58-year-old female referred for GI evaluation and a copy will be sent to the primary care provider. Patient was previously seen by Dr. Ricketts. Patient has a history of leukopenia and was seeing hematology and also history of breast cancer for which she started chemotherapy in 2013.  She completed radiation therapy in 2014 and has been on tamoxifen.  She has a history of mild hypertension.  Previously patient was under the care of Dr. Buckner and I do see an upper endoscopy on November 29, 2021 done for dyspepsia and heartburn.  This showed a GE junction polyp that was 5 mm and biopsied also some erythema of the antrum.  Biopsies were sent.  Pathology showed slight chronic gastritis but no H. pylori and EGD junction biopsy showed focal active inflammation and columnar mucosa with hyperplastic or reactive changes but no intestinal metaplasia.  Colonoscopy was done on September 13, 2021 for change in bowel habits, weight loss and constipation.  This exam revealed no gross abnormalities.  I do see labs from 2019 that shows a normal hemoglobin of 11.7, low white blood cell count of 3.1, normal transaminases.  Right upper quadrant ultrasound in 2019 was normal. Patient finished 5 iron infusion that started in April 2023. With routine lab work patient was informed her AST 67/ALT 50 are elevated. Rechecked liver enzymes on 6/19/22 and levels were still elevated. Most recent labs showed liver enzymes have normalized, the elevation was likely caused by the iron infusion. Current ferritin 32 per patient. She is not on iron supplements.   CT scan done on 12/15/23 revealed a kidney cyst otherwise unremarkable.  Today, patient notes she continues to experience reflux but is avoiding triggering foods. She recently began taking omeprazole before coffee in the mornings which has made a difference. Patient notes she does wake up with reflux and hoarseness. Denies dysphagia, odynophagia, nausea, or vomiting. Denies abdominal pain, diarrhea, constipation, hematochezia, or melena. Patient notes she does not take any other medication besides omeprazole. Denies cardiac, lung, or kidney issues. No pacemaker, No blood thinner, no home O2.  ***3/2024: Here to discuss hemorrhoidal discomfort. Has tried hemorrhoidal creams in the past that have worked well, but ran out. Hx of constipation. BMs small, pebbly. Used Linzess in the past, but did not help much. Had Pill Cam/EGD/Colon without etiology for anemia. Did pass the Pill Cam in her stool. Pill Cam showed a aphtha, red spot, but did not eval the whole SB so was rec'd to repeat if iron levels dip again. States they are stable and she had another infusion in Jan 2024.

## 2024-03-26 LAB — H PYLORI BREATH TEST: NOT DETECTED

## 2024-05-20 ENCOUNTER — TELEPHONE ENCOUNTER (OUTPATIENT)
Dept: URBAN - METROPOLITAN AREA CLINIC 23 | Facility: CLINIC | Age: 59
End: 2024-05-20

## 2024-09-12 ENCOUNTER — DASHBOARD ENCOUNTERS (OUTPATIENT)
Age: 59
End: 2024-09-12

## 2024-09-12 ENCOUNTER — OFFICE VISIT (OUTPATIENT)
Dept: URBAN - METROPOLITAN AREA CLINIC 96 | Facility: CLINIC | Age: 59
End: 2024-09-12
Payer: COMMERCIAL

## 2024-09-12 VITALS
TEMPERATURE: 98.8 F | WEIGHT: 107 LBS | SYSTOLIC BLOOD PRESSURE: 127 MMHG | HEART RATE: 61 BPM | DIASTOLIC BLOOD PRESSURE: 87 MMHG | HEIGHT: 62 IN | BODY MASS INDEX: 19.69 KG/M2

## 2024-09-12 DIAGNOSIS — D50.8 OTHER IRON DEFICIENCY ANEMIA: ICD-10-CM

## 2024-09-12 DIAGNOSIS — K21.9 GERD: ICD-10-CM

## 2024-09-12 PROCEDURE — 99214 OFFICE O/P EST MOD 30 MIN: CPT | Performed by: INTERNAL MEDICINE

## 2024-09-12 RX ORDER — FAMOTIDINE 40 MG/1
1 TABLET AT BEDTIME TABLET, FILM COATED ORAL ONCE A DAY
Qty: 90 TABLET | Refills: 3 | OUTPATIENT

## 2024-09-12 RX ORDER — FAMOTIDINE 40 MG/1
1 TABLET AT BEDTIME TABLET, FILM COATED ORAL ONCE A DAY
Qty: 90 TABLET | Refills: 3 | Status: DISCONTINUED | COMMUNITY
Start: 2023-11-03

## 2024-09-12 RX ORDER — ONDANSETRON HYDROCHLORIDE 4 MG/1
2 TABLET TABLET, FILM COATED ORAL
Qty: 2 | Refills: 0 | Status: DISCONTINUED | COMMUNITY
Start: 2023-11-03

## 2024-09-12 RX ORDER — OMEPRAZOLE 10 MG/1
1 CAPSULE 30 MINUTES BEFORE MORNING MEAL CAPSULE, DELAYED RELEASE ORAL ONCE A DAY
Qty: 90 | Refills: 3 | OUTPATIENT
Start: 2024-09-12

## 2024-09-12 RX ORDER — OMEPRAZOLE 20 MG/1
1 CAPSULE 30 MINUTES BEFORE MORNING MEAL CAPSULE, DELAYED RELEASE ORAL ONCE A DAY
Qty: 90 | Refills: 3 | Status: ACTIVE | COMMUNITY
Start: 2023-08-25

## 2024-09-12 NOTE — HPI-TODAY'S VISIT:
pcp Criselda Oro wants to transfer her care to our office moved from [place], was seeing Dr Dudley ledbetter of breast cancer last year was diagnosed with EFRAIN  also was having issues w/ LFTs s/p egd/colon/pill cam saw Dr Weiss for elevated LFts--have improved and normalized. was thought it was related iron overload  oncologist: at [place]- has gotten one iron infusion last march is [facility], her son went to [facility] hematology thought it could be omeprazole, dose reduced to 20 mg and doing well been on PPI for 10 years, used to be dexilant at 48--> feel like she cant swallow, burping

## 2024-10-15 ENCOUNTER — WEB ENCOUNTER (OUTPATIENT)
Dept: URBAN - METROPOLITAN AREA CLINIC 96 | Facility: CLINIC | Age: 59
End: 2024-10-15

## 2025-01-31 NOTE — PHYSICAL EXAM NEUROLOGIC:
Bed: 12  Expected date:   Expected time:   Means of arrival:   Comments:  DG fall   oriented to person, place and time , normal mood with an appropriate affect

## 2025-03-13 ENCOUNTER — OFFICE VISIT (OUTPATIENT)
Dept: URBAN - METROPOLITAN AREA CLINIC 96 | Facility: CLINIC | Age: 60
End: 2025-03-13
Payer: COMMERCIAL

## 2025-03-13 VITALS
HEIGHT: 62 IN | WEIGHT: 107 LBS | BODY MASS INDEX: 19.69 KG/M2 | DIASTOLIC BLOOD PRESSURE: 77 MMHG | TEMPERATURE: 97.6 F | HEART RATE: 61 BPM | SYSTOLIC BLOOD PRESSURE: 132 MMHG

## 2025-03-13 DIAGNOSIS — D50.9 IDA (IRON DEFICIENCY ANEMIA): ICD-10-CM

## 2025-03-13 DIAGNOSIS — Z85.3 PERSONAL HISTORY OF BREAST CANCER: ICD-10-CM

## 2025-03-13 DIAGNOSIS — R74.8 ELEVATED LIVER ENZYMES: ICD-10-CM

## 2025-03-13 DIAGNOSIS — K21.9 GASTROESOPHAGEAL REFLUX DISEASE, UNSPECIFIED WHETHER ESOPHAGITIS PRESENT: ICD-10-CM

## 2025-03-13 DIAGNOSIS — K64.8 INTERNAL HEMORRHOID: ICD-10-CM

## 2025-03-13 DIAGNOSIS — K59.09 CHRONIC CONSTIPATION: ICD-10-CM

## 2025-03-13 DIAGNOSIS — K31.7 GASTRIC POLYPS: ICD-10-CM

## 2025-03-13 PROBLEM — 78809005: Status: ACTIVE | Noted: 2025-03-13

## 2025-03-13 PROCEDURE — 99214 OFFICE O/P EST MOD 30 MIN: CPT | Performed by: INTERNAL MEDICINE

## 2025-03-13 RX ORDER — OMEPRAZOLE 20 MG/1
1 CAPSULE 30 MINUTES BEFORE MORNING MEAL CAPSULE, DELAYED RELEASE ORAL ONCE A DAY
Qty: 90 | Refills: 3 | Status: ACTIVE | COMMUNITY
Start: 2023-08-25

## 2025-03-13 RX ORDER — FAMOTIDINE 40 MG/1
1 TABLET AT BEDTIME TABLET, FILM COATED ORAL ONCE A DAY
Qty: 90 TABLET | Refills: 3 | Status: DISCONTINUED | COMMUNITY

## 2025-03-13 RX ORDER — OMEPRAZOLE 20 MG/1
TAKE 1 CAPSULE BY MOUTH EVERY DAY CAPSULE, DELAYED RELEASE ORAL
Qty: 90 EACH | Refills: 0 | Status: ACTIVE | COMMUNITY

## 2025-03-13 NOTE — HPI-TODAY'S VISIT:
tried 10 mg of omeprazole as discussed and started causing symptoms, did this for 4 days but causing burning at night. then went back to the 20 mg. did labs with DR [place]--oncologist--[place] LFT normal. rest were also normal except ferritin---did an IV iron infusion 2 weeks ago  ======================== pcp Criselda Oro wants to transfer her care to our office moved from [place], was seeing Dr uDdley ledbetter of breast cancer last year was diagnosed with EFRAIN  also was having issues w/ LFTs s/p egd/colon/pill cam saw Dr Weiss for elevated LFts--have improved and normalized. was thought it was related iron overload  oncologist: at [facility] [place]- has gotten one iron infusion last march is [facility], her son went to [facility] hematology thought it could be omeprazole, dose reduced to 20 mg and doing well been on PPI for 10 years, used to be dexilant at 48--> feel like she cant swallow, burping

## 2025-03-18 ENCOUNTER — WEB ENCOUNTER (OUTPATIENT)
Dept: URBAN - METROPOLITAN AREA CLINIC 96 | Facility: CLINIC | Age: 60
End: 2025-03-18

## 2025-03-18 RX ORDER — FAMOTIDINE 20 MG/1
TAKE 1 TABLET TABLET, FILM COATED ORAL ONCE A DAY
Qty: 90 | Refills: 3 | OUTPATIENT
Start: 2025-03-19

## 2025-03-19 ENCOUNTER — OFFICE VISIT (OUTPATIENT)
Dept: URBAN - METROPOLITAN AREA SURGERY CENTER 18 | Facility: SURGERY CENTER | Age: 60
End: 2025-03-19
Payer: COMMERCIAL

## 2025-03-19 ENCOUNTER — WEB ENCOUNTER (OUTPATIENT)
Dept: URBAN - METROPOLITAN AREA CLINIC 96 | Facility: CLINIC | Age: 60
End: 2025-03-19

## 2025-03-19 DIAGNOSIS — K31.89 OTHER DISEASES OF STOMACH AND DUODENUM: ICD-10-CM

## 2025-03-19 DIAGNOSIS — K22.89 OTHER SPECIFIED DISEASE OF ESOPHAGUS: ICD-10-CM

## 2025-03-19 DIAGNOSIS — K29.70 GASTRITIS: ICD-10-CM

## 2025-03-19 DIAGNOSIS — K29.60 OTHER GASTRITIS WITHOUT BLEEDING: ICD-10-CM

## 2025-03-19 DIAGNOSIS — D50.9 ANEMIA, IRON DEFICIENCY: ICD-10-CM

## 2025-03-19 PROCEDURE — 43239 EGD BIOPSY SINGLE/MULTIPLE: CPT | Performed by: INTERNAL MEDICINE

## 2025-03-19 PROCEDURE — 00731 ANES UPR GI NDSC PX NOS: CPT

## 2025-03-19 RX ORDER — OMEPRAZOLE 20 MG/1
TAKE 1 CAPSULE BY MOUTH EVERY DAY CAPSULE, DELAYED RELEASE ORAL
Qty: 90 EACH | Refills: 0 | Status: ACTIVE | COMMUNITY

## 2025-03-19 RX ORDER — OMEPRAZOLE 20 MG/1
1 CAPSULE 30 MINUTES BEFORE MORNING MEAL CAPSULE, DELAYED RELEASE ORAL ONCE A DAY
Qty: 90 | Refills: 3 | Status: ACTIVE | COMMUNITY
Start: 2023-08-25

## 2025-03-31 ENCOUNTER — TELEPHONE ENCOUNTER (OUTPATIENT)
Dept: URBAN - METROPOLITAN AREA CLINIC 97 | Facility: CLINIC | Age: 60
End: 2025-03-31

## 2025-04-02 ENCOUNTER — TELEPHONE ENCOUNTER (OUTPATIENT)
Dept: URBAN - METROPOLITAN AREA CLINIC 96 | Facility: CLINIC | Age: 60
End: 2025-04-02

## 2025-04-04 ENCOUNTER — TELEPHONE ENCOUNTER (OUTPATIENT)
Dept: URBAN - METROPOLITAN AREA CLINIC 96 | Facility: CLINIC | Age: 60
End: 2025-04-04